# Patient Record
Sex: MALE | Race: WHITE | NOT HISPANIC OR LATINO | Employment: PART TIME | URBAN - METROPOLITAN AREA
[De-identification: names, ages, dates, MRNs, and addresses within clinical notes are randomized per-mention and may not be internally consistent; named-entity substitution may affect disease eponyms.]

---

## 2018-01-16 ENCOUNTER — OFFICE VISIT (OUTPATIENT)
Dept: RETAIL CLINIC | Facility: CLINIC | Age: 23
End: 2018-01-16

## 2018-01-16 VITALS
OXYGEN SATURATION: 98 % | TEMPERATURE: 97.6 F | HEART RATE: 92 BPM | SYSTOLIC BLOOD PRESSURE: 130 MMHG | DIASTOLIC BLOOD PRESSURE: 80 MMHG | RESPIRATION RATE: 18 BRPM

## 2018-01-16 DIAGNOSIS — H65.192 OTHER ACUTE NONSUPPURATIVE OTITIS MEDIA OF LEFT EAR, RECURRENCE NOT SPECIFIED: ICD-10-CM

## 2018-01-16 DIAGNOSIS — H65.93 BILATERAL SEROUS OTITIS MEDIA, UNSPECIFIED CHRONICITY: Primary | ICD-10-CM

## 2018-01-16 PROCEDURE — 99213 OFFICE O/P EST LOW 20 MIN: CPT | Performed by: NURSE PRACTITIONER

## 2018-01-16 RX ORDER — PREDNISONE 20 MG/1
20 TABLET ORAL 2 TIMES DAILY
Qty: 14 TABLET | Refills: 0 | Status: SHIPPED | OUTPATIENT
Start: 2018-01-16 | End: 2018-01-23

## 2018-01-16 RX ORDER — AMOXICILLIN 875 MG/1
875 TABLET, COATED ORAL EVERY 12 HOURS SCHEDULED
Qty: 20 TABLET | Refills: 0 | Status: SHIPPED | OUTPATIENT
Start: 2018-01-16 | End: 2018-01-26

## 2018-01-16 NOTE — PROGRESS NOTES
Subjective   Tobi Horne is a 22 y.o. male.     HPI Comments: Patient appears to be in great deal of pain during visit and rates left ear pain 9/10. Offered to call someone to transport him to higher level of care but he has declined.     Earache    There is pain in the left ear. This is a new problem. The current episode started today. The problem occurs constantly. The problem has been unchanged. There has been no fever. The pain is at a severity of 9/10. The pain is severe. Associated symptoms include hearing loss (left ear ) and neck pain (left side). Pertinent negatives include no abdominal pain, coughing, diarrhea, ear discharge, headaches, rhinorrhea, sore throat or vomiting. He has tried acetaminophen for the symptoms. The treatment provided no relief. His past medical history is significant for a tympanostomy tube (in childhood). There is no history of a chronic ear infection or hearing loss.       The following portions of the patient's history were reviewed and updated as appropriate: allergies, current medications, past family history, past medical history, past social history, past surgical history and problem list.    Review of Systems   Constitutional: Negative for appetite change, chills, diaphoresis, fatigue and fever.   HENT: Positive for ear pain (left only) and hearing loss (left ear ). Negative for congestion, ear discharge, facial swelling, mouth sores, nosebleeds, postnasal drip, rhinorrhea, sinus pain, sinus pressure, sneezing, sore throat, tinnitus, trouble swallowing and voice change.    Eyes: Negative for visual disturbance.   Respiratory: Negative for cough, chest tightness and wheezing.    Gastrointestinal: Negative for abdominal pain, constipation, diarrhea, nausea and vomiting.   Genitourinary: Negative for decreased urine volume.   Musculoskeletal: Positive for neck pain (left side). Negative for myalgias and neck stiffness.   Skin: Negative for color change and wound.    Allergic/Immunologic: Negative for immunocompromised state.   Neurological: Negative for dizziness and headaches.       Objective   Physical Exam   Constitutional: He is oriented to person, place, and time. He appears well-developed and well-nourished. He is cooperative.  Non-toxic appearance. He does not appear ill. He appears distressed (holding left ear ).   HENT:   Right Ear: Hearing, external ear and ear canal normal. No drainage, swelling or tenderness. No foreign bodies. No mastoid tenderness. Tympanic membrane is scarred. Tympanic membrane is not perforated, not erythematous, not retracted and not bulging. A middle ear effusion (small serous) is present. No decreased hearing is noted.   Left Ear: External ear and ear canal normal. There is tenderness. No drainage or swelling. No foreign bodies. No mastoid tenderness. Tympanic membrane is scarred, erythematous and bulging. Tympanic membrane is not perforated and not retracted. A middle ear effusion (large serous) is present. Decreased hearing is noted.   Nose: Nose normal. Right sinus exhibits no maxillary sinus tenderness and no frontal sinus tenderness. Left sinus exhibits no maxillary sinus tenderness and no frontal sinus tenderness.   Mouth/Throat: Uvula is midline, oropharynx is clear and moist and mucous membranes are normal. No oral lesions. No uvula swelling. No oropharyngeal exudate, posterior oropharyngeal edema or posterior oropharyngeal erythema. Tonsils are 2+ on the right. Tonsils are 2+ on the left. No tonsillar exudate.   Eyes: Conjunctivae and lids are normal.   Cardiovascular: Normal rate, regular rhythm, S1 normal and S2 normal.    Pulmonary/Chest: Effort normal and breath sounds normal.   Lymphadenopathy:     He has no cervical adenopathy.   Neurological: He is alert and oriented to person, place, and time.   Skin: Skin is warm and dry. He is not diaphoretic. No pallor.   Vitals reviewed.      Assessment/Plan   Tobi was seen today for  earache.    Diagnoses and all orders for this visit:    Acute serous otitis media of left ear, recurrence not specified  -     amoxicillin (AMOXIL) 875 MG tablet; Take 1 tablet by mouth Every 12 (Twelve) Hours for 10 days.  -     predniSONE (DELTASONE) 20 MG tablet; Take 1 tablet by mouth 2 (Two) Times a Day for 7 days.          -     May use warm compress to left ear 10 minute intervals x 3 times a day for pain relief        -     May alternate ibuprofen/tylenol OTC for pain relief        -     Follow up with PCP for persistent symptoms         -     Follow up with UC/ER for worsening symptoms

## 2018-01-16 NOTE — PATIENT INSTRUCTIONS
Serous Otitis Media  Serous otitis media is fluid in the middle ear space. This space contains the bones for hearing and air. Air in the middle ear space helps to transmit sound.   The air gets there through the eustachian tube. This tube goes from the back of the nose (nasopharynx) to the middle ear space. It keeps the pressure in the middle ear the same as the outside world. It also helps to drain fluid from the middle ear space.  CAUSES   Serous otitis media occurs when the eustachian tube gets blocked. Blockage can come from:  · Ear infections.  · Colds and other upper respiratory infections.  · Allergies.  · Irritants such as cigarette smoke.  · Sudden changes in air pressure (such as descending in an airplane).  · Enlarged adenoids.  · A mass in the nasopharynx.  During colds and upper respiratory infections, the middle ear space can become temporarily filled with fluid. This can happen after an ear infection also. Once the infection clears, the fluid will generally drain out of the ear through the eustachian tube. If it does not, then serous otitis media occurs.  SIGNS AND SYMPTOMS   · Hearing loss.  · A feeling of fullness in the ear, without pain.  · Young children may not show any symptoms but may show slight behavioral changes, such as agitation, ear pulling, or crying.  DIAGNOSIS   Serous otitis media is diagnosed by an ear exam. Tests may be done to check on the movement of the eardrum. Hearing exams may also be done.  TREATMENT   The fluid most often goes away without treatment. If allergy is the cause, allergy treatment may be helpful. Fluid that persists for several months may require minor surgery. A small tube is placed in the eardrum to:  · Drain the fluid.  · Restore the air in the middle ear space.  In certain situations, antibiotic medicines are used to avoid surgery. Surgery may be done to remove enlarged adenoids (if this is the cause).  HOME CARE INSTRUCTIONS   · Keep children away from  tobacco smoke.  · Keep all follow-up visits as directed by your health care provider.  SEEK MEDICAL CARE IF:   · Your hearing is not better in 3 months.  · Your hearing is worse.  · You have ear pain.  · You have drainage from the ear.  · You have dizziness.  · You have serous otitis media only in one ear or have any bleeding from your nose (epistaxis).  · You notice a lump on your neck.  MAKE SURE YOU:  · Understand these instructions.    · Will watch your condition.    · Will get help right away if you are not doing well or get worse.    This information is not intended to replace advice given to you by your health care provider. Make sure you discuss any questions you have with your health care provider.  Document Released: 03/09/2005 Document Revised: 01/08/2016 Document Reviewed: 07/15/2014  Elsevier Interactive Patient Education © 2017 [employer]

## 2021-01-08 ENCOUNTER — HOSPITAL ENCOUNTER (EMERGENCY)
Facility: HOSPITAL | Age: 26
Discharge: HOME | End: 2021-01-08
Admitting: EMERGENCY MEDICINE

## 2021-01-08 ENCOUNTER — APPOINTMENT (OUTPATIENT)
Dept: GENERAL RADIOLOGY | Facility: HOSPITAL | Age: 26
End: 2021-01-08

## 2021-01-08 VITALS
SYSTOLIC BLOOD PRESSURE: 104 MMHG | RESPIRATION RATE: 18 BRPM | WEIGHT: 141.31 LBS | BODY MASS INDEX: 19.14 KG/M2 | OXYGEN SATURATION: 99 % | HEART RATE: 64 BPM | DIASTOLIC BLOOD PRESSURE: 65 MMHG | TEMPERATURE: 97.6 F | HEIGHT: 72 IN

## 2021-01-08 DIAGNOSIS — R07.89 CHEST WALL PAIN: Primary | ICD-10-CM

## 2021-01-08 LAB
ALBUMIN SERPL-MCNC: 4.3 G/DL (ref 3.5–5.2)
ALBUMIN/GLOB SERPL: 2 G/DL
ALP SERPL-CCNC: 63 U/L (ref 39–117)
ALT SERPL W P-5'-P-CCNC: 7 U/L (ref 1–41)
ANION GAP SERPL CALCULATED.3IONS-SCNC: 8 MMOL/L (ref 5–15)
AST SERPL-CCNC: 10 U/L (ref 1–40)
BASOPHILS # BLD AUTO: 0 10*3/MM3 (ref 0–0.2)
BASOPHILS NFR BLD AUTO: 0.5 % (ref 0–1.5)
BILIRUB SERPL-MCNC: 0.3 MG/DL (ref 0–1.2)
BUN SERPL-MCNC: 11 MG/DL (ref 6–20)
BUN/CREAT SERPL: 15.1 (ref 7–25)
CALCIUM SPEC-SCNC: 9.4 MG/DL (ref 8.6–10.5)
CHLORIDE SERPL-SCNC: 105 MMOL/L (ref 98–107)
CO2 SERPL-SCNC: 27 MMOL/L (ref 22–29)
CREAT SERPL-MCNC: 0.73 MG/DL (ref 0.76–1.27)
D DIMER PPP FEU-MCNC: <0.19 MG/L (FEU) (ref 0–0.59)
DEPRECATED RDW RBC AUTO: 43.3 FL (ref 37–54)
EOSINOPHIL # BLD AUTO: 0.1 10*3/MM3 (ref 0–0.4)
EOSINOPHIL NFR BLD AUTO: 1.5 % (ref 0.3–6.2)
ERYTHROCYTE [DISTWIDTH] IN BLOOD BY AUTOMATED COUNT: 13.2 % (ref 12.3–15.4)
GFR SERPL CREATININE-BSD FRML MDRD: 131 ML/MIN/1.73
GLOBULIN UR ELPH-MCNC: 2.2 GM/DL
GLUCOSE SERPL-MCNC: 102 MG/DL (ref 65–99)
HCT VFR BLD AUTO: 45.2 % (ref 37.5–51)
HGB BLD-MCNC: 15.4 G/DL (ref 13–17.7)
HOLD SPECIMEN: NORMAL
HOLD SPECIMEN: NORMAL
INR PPP: 0.99 (ref 0.93–1.1)
LYMPHOCYTES # BLD AUTO: 2.8 10*3/MM3 (ref 0.7–3.1)
LYMPHOCYTES NFR BLD AUTO: 32.6 % (ref 19.6–45.3)
MCH RBC QN AUTO: 32 PG (ref 26.6–33)
MCHC RBC AUTO-ENTMCNC: 34.1 G/DL (ref 31.5–35.7)
MCV RBC AUTO: 93.9 FL (ref 79–97)
MONOCYTES # BLD AUTO: 0.8 10*3/MM3 (ref 0.1–0.9)
MONOCYTES NFR BLD AUTO: 9.6 % (ref 5–12)
NEUTROPHILS NFR BLD AUTO: 4.8 10*3/MM3 (ref 1.7–7)
NEUTROPHILS NFR BLD AUTO: 55.8 % (ref 42.7–76)
NRBC BLD AUTO-RTO: 0 /100 WBC (ref 0–0.2)
NT-PROBNP SERPL-MCNC: 30.5 PG/ML (ref 0–450)
PLATELET # BLD AUTO: 272 10*3/MM3 (ref 140–450)
PMV BLD AUTO: 9.1 FL (ref 6–12)
POTASSIUM SERPL-SCNC: 3.4 MMOL/L (ref 3.5–5.2)
PROT SERPL-MCNC: 6.5 G/DL (ref 6–8.5)
PROTHROMBIN TIME: 10.9 SECONDS (ref 9.6–11.7)
RBC # BLD AUTO: 4.81 10*6/MM3 (ref 4.14–5.8)
SODIUM SERPL-SCNC: 140 MMOL/L (ref 136–145)
TROPONIN T SERPL-MCNC: <0.01 NG/ML (ref 0–0.03)
WBC # BLD AUTO: 8.7 10*3/MM3 (ref 3.4–10.8)
WHOLE BLOOD HOLD SPECIMEN: NORMAL
WHOLE BLOOD HOLD SPECIMEN: NORMAL

## 2021-01-08 PROCEDURE — 85025 COMPLETE CBC W/AUTO DIFF WBC: CPT | Performed by: PHYSICIAN ASSISTANT

## 2021-01-08 PROCEDURE — 80053 COMPREHEN METABOLIC PANEL: CPT | Performed by: PHYSICIAN ASSISTANT

## 2021-01-08 PROCEDURE — 85379 FIBRIN DEGRADATION QUANT: CPT | Performed by: PHYSICIAN ASSISTANT

## 2021-01-08 PROCEDURE — 99284 EMERGENCY DEPT VISIT MOD MDM: CPT

## 2021-01-08 PROCEDURE — 84484 ASSAY OF TROPONIN QUANT: CPT | Performed by: PHYSICIAN ASSISTANT

## 2021-01-08 PROCEDURE — 83880 ASSAY OF NATRIURETIC PEPTIDE: CPT | Performed by: PHYSICIAN ASSISTANT

## 2021-01-08 PROCEDURE — 93005 ELECTROCARDIOGRAM TRACING: CPT

## 2021-01-08 PROCEDURE — 85610 PROTHROMBIN TIME: CPT | Performed by: PHYSICIAN ASSISTANT

## 2021-01-08 PROCEDURE — 71045 X-RAY EXAM CHEST 1 VIEW: CPT

## 2021-01-08 RX ORDER — SODIUM CHLORIDE 0.9 % (FLUSH) 0.9 %
10 SYRINGE (ML) INJECTION AS NEEDED
Status: DISCONTINUED | OUTPATIENT
Start: 2021-01-08 | End: 2021-01-09 | Stop reason: HOSPADM

## 2021-01-08 RX ORDER — ASPIRIN 81 MG/1
324 TABLET, CHEWABLE ORAL ONCE
Status: COMPLETED | OUTPATIENT
Start: 2021-01-08 | End: 2021-01-08

## 2021-01-08 RX ORDER — POTASSIUM CHLORIDE 20 MEQ/1
20 TABLET, EXTENDED RELEASE ORAL ONCE
Status: COMPLETED | OUTPATIENT
Start: 2021-01-08 | End: 2021-01-08

## 2021-01-08 RX ORDER — POTASSIUM CHLORIDE 20 MEQ/1
10 TABLET, EXTENDED RELEASE ORAL DAILY
Status: DISCONTINUED | OUTPATIENT
Start: 2021-01-09 | End: 2021-01-08

## 2021-01-08 RX ORDER — NAPROXEN 500 MG/1
500 TABLET ORAL 2 TIMES DAILY WITH MEALS
Qty: 20 TABLET | Refills: 0 | Status: SHIPPED | OUTPATIENT
Start: 2021-01-08 | End: 2021-01-20

## 2021-01-08 RX ADMIN — ASPIRIN 324 MG: 81 TABLET, CHEWABLE ORAL at 20:51

## 2021-01-08 RX ADMIN — POTASSIUM CHLORIDE 20 MEQ: 1500 TABLET, EXTENDED RELEASE ORAL at 22:39

## 2021-01-09 NOTE — DISCHARGE INSTRUCTIONS
You may take naproxen as needed for pain.  Do not mix with other NSAID such as ibuprofen diclofenac or Aleve.    Follow-up with your primary care provider in 3-5 days.  If you do not have a primary care provider call [phone] for help in finding one, or you may follow up with [facility] at [phone].    Follow-up with cardiology for further evaluation and management.    Return to ED for any new or worsening symptoms

## 2021-01-09 NOTE — ED PROVIDER NOTES
Subjective   Patient is a 25-year-old male who presents with complaints of squeezing type chest pain that is all the way across his chest that started the evening of 1/6/2021.  Patient states he was seen in the ED that day and was diagnosed with SVT he was given adenosine and sent home and was advised to follow-up with primary care provider, cardiology, and pulmonology.  Patient states when he was discharged he felt fine he took a nap and when he woke up that evening he was having aching type pain but states the pain has continued to progress over the past few days.  He currently rates it a 4/10 severity.  Patient states the pain does radiate into his left arm at times.  He states it is worse with exertion.  He reports associated shortness of breath.  Patient states the pain does feel different than when he went into the ED on 1/6/2021 he states then it felt more like heart palpitations today is more squeezing.  Pertinent negatives include bilateral lower extremity edema, fever, cough, nausea, vomiting, diarrhea, abdominal pain, recent travel, known sick contacts.  Patient states he was able to schedule follow-up appointment with his primary care provider for 20 January has not followed up with cardiology or pulmonology yet.  He was advised to follow-up with pulmonology as there were some pulmonary nodules noted on his chest x-ray.          Review of Systems   Constitutional: Negative.    HENT: Negative.    Eyes: Negative.    Respiratory: Positive for shortness of breath. Negative for apnea, cough, choking, chest tightness, wheezing and stridor.    Cardiovascular: Positive for chest pain. Negative for palpitations and leg swelling.   Gastrointestinal: Negative for abdominal distention, abdominal pain, constipation, diarrhea, nausea and vomiting.   Genitourinary: Negative.    Musculoskeletal: Negative for back pain, neck pain and neck stiffness.   Skin: Negative.    Neurological: Negative.    Hematological: Negative.         No past medical history on file.    No Known Allergies    Past Surgical History:   Procedure Laterality Date   • TYMPANOSTOMY         Family History   Problem Relation Age of Onset   • Nusrat Parkinson White syndrome Mother    • Alcohol abuse Father    • Glaucoma Maternal Grandfather    • Hypertension Paternal Grandmother    • Rheum arthritis Paternal Grandmother    • Diabetes Paternal Grandfather    • Alcohol abuse Paternal Grandfather        Social History     Socioeconomic History   • Marital status: Single     Spouse name: Not on file   • Number of children: 0   • Years of education: Not on file   • Highest education level: Not on file   Occupational History   • Occupation: warehouse    Tobacco Use   • Smoking status: Current Every Day Smoker     Types: Cigarettes   • Smokeless tobacco: Never Used   Substance and Sexual Activity   • Alcohol use: Yes     Comment: rare   • Drug use: No   • Sexual activity: Defer           Objective   Physical Exam  Vitals signs and nursing note reviewed.   Constitutional:       General: He is not in acute distress.     Appearance: He is well-developed. He is not ill-appearing, toxic-appearing or diaphoretic.   HENT:      Head: Normocephalic and atraumatic.      Mouth/Throat:      Mouth: Mucous membranes are moist.      Pharynx: Oropharynx is clear.   Eyes:      Extraocular Movements: Extraocular movements intact.      Pupils: Pupils are equal, round, and reactive to light.   Neck:      Musculoskeletal: Normal range of motion.   Cardiovascular:      Rate and Rhythm: Normal rate and regular rhythm.      Heart sounds: No murmur. No friction rub. No gallop.    Pulmonary:      Effort: Pulmonary effort is normal. No tachypnea, accessory muscle usage or respiratory distress.      Breath sounds: No decreased breath sounds, wheezing, rhonchi or rales.   Chest:      Chest wall: Tenderness present. No mass, deformity or crepitus.   Abdominal:      General: Bowel sounds are  "normal. There is no abdominal bruit.      Palpations: Abdomen is soft. There is no hepatomegaly, splenomegaly or mass.      Tenderness: There is no abdominal tenderness. There is no guarding or rebound.   Musculoskeletal:      Right lower leg: No edema.      Left lower leg: No edema.   Skin:     General: Skin is warm.      Capillary Refill: Capillary refill takes less than 2 seconds.      Findings: No rash.   Neurological:      General: No focal deficit present.      Mental Status: He is alert and oriented to person, place, and time.   Psychiatric:         Mood and Affect: Mood normal.         Behavior: Behavior normal.         Procedures           ED Course    /57   Pulse 70   Temp 97.6 °F (36.4 °C) (Oral)   Resp 18   Ht 182.9 cm (72\")   Wt 64.1 kg (141 lb 5 oz)   SpO2 99%   BMI 19.17 kg/m²   Medications   sodium chloride 0.9 % flush 10 mL (has no administration in time range)   sodium chloride 0.9 % flush 10 mL (has no administration in time range)   aspirin chewable tablet 324 mg (324 mg Oral Given 1/8/21 2051)   potassium chloride (K-DUR,KLOR-CON) CR tablet 20 mEq (20 mEq Oral Given 1/8/21 2239)     Labs Reviewed   COMPREHENSIVE METABOLIC PANEL - Abnormal; Notable for the following components:       Result Value    Glucose 102 (*)     Creatinine 0.73 (*)     Potassium 3.4 (*)     All other components within normal limits    Narrative:     GFR Normal >60  Chronic Kidney Disease <60  Kidney Failure <15     TROPONIN (IN-HOUSE) - Normal    Narrative:     Troponin T Reference Range:  <= 0.03 ng/mL-   Negative for AMI  >0.03 ng/mL-     Abnormal for myocardial necrosis.  Clinicians would have to utilize clinical acumen, EKG, Troponin and serial changes to determine if it is an Acute Myocardial Infarction or myocardial injury due to an underlying chronic condition.       Results may be falsely decreased if patient taking Biotin.     PROTIME-INR - Normal   BNP (IN-HOUSE) - Normal    Narrative:     Among " patients with dyspnea, NT-proBNP is highly sensitive for the detection of acute congestive heart failure. In addition NT-proBNP of <300 pg/ml effectively rules out acute congestive heart failure with 99% negative predictive value.    Results may be falsely decreased if patient taking Biotin.     D-DIMER, QUANTITATIVE - Normal    Narrative:     Reference Range  --------------------------------------------------------------------     < 0.50   Negative Predictive Value  0.50-0.59   Indeterminate    >= 0.60   Probable VTE             A very low percentage of patients with DVT may yield D-Dimer results   below the cut-off of 0.50 mg/L FEU.  This is known to be more   prevalent in patients with distal DVT.             Results of this test should always be interpreted in conjunction with   the patient's medical history, clinical presentation and other   findings.  Clinical diagnosis should not be based on the result of   [place] D-Dimer alone.   CBC WITH AUTO DIFFERENTIAL - Normal   RAINBOW DRAW    Narrative:     The following orders were created for panel order [place] Draw.  Procedure                               Abnormality         Status                     ---------                               -----------         ------                     Light Blue Top[[id number]]                                    Final result               Green Top (Gel)[[id number]]                                   Final result               Lavender Top[[id number]]                                      Final result               Gold Top - SST[[id number]]                                   Final result                 Please view results for these tests on the individual orders.   TROPONIN (IN-HOUSE)   LIGHT BLUE TOP   GREEN TOP   LAVENDER TOP   GOLD TOP - SST   CBC AND DIFFERENTIAL    Narrative:     The following orders were created for panel order CBC & Differential.  Procedure                               Abnormality         Status                      ---------                               -----------         ------                     CBC Auto Differential[[id number]]        Normal              Final result                 Please view results for these tests on the individual orders.     Xr Chest 1 View    Result Date: 1/8/2021  1.No acute pulmonary process.  Electronically Signed By-Mango Galan MD On:1/8/2021 8:52 PM This report was finalized on [id number] by  Mango Galan MD.                                           MDM  Number of Diagnoses or Management Options  Chest wall pain:   Diagnosis management comments: Chart Review:  Comorbidity: History of SVT  Differentials: ACS, electrolyte abnormality, PE, dissection, pleural effusion, pneumonia     ;this list is not all inclusive and does not constitute the entirety of considered causes  ECG: Interpreted by myself and Dr. Myers shows sinus rhythm rate 92 nonspecific T wave abnormalities in lateral leads no previous EKG to review.  Labs: Troponin pro time INR proBNP D-dimer all within normal limits as above.  CBC unremarkable CMP shows glucose 102 creatinine 0.73 sodium 3.4 is unremarkable as above. Old chart reviewed from patient's ED visit on 1/6/2021 which showed a potassium of 3.1  Imaging: Was interpreted by physician and reviewed by myself:  Xr Chest 1 View  Result Date: 1/8/2021  1.No acute pulmonary process.  Electronically Signed By-Mango Galan MD On:1/8/2021 8:52 PM This report was finalized on [id number] by  Mango Galan MD.    Disposition/Treatment:  Appropriate PPE was worn during exam and throughout all encounters with the patient.  When the ED IV was placed and labs are obtained patient was afebrile and appeared nontoxic his chest pain is reproducible.  Chart reviewed from patient's ED visit to [facility] looks like he presented to the ED in SVT he was given additional 6 mg of adenosine and then an additional 12 mg fairly unremarkable blood work and a negative urine drug screen.  He  was discharged home to follow-up with primary care provider cardiology and pulmonology.  Patient states his chest pain today is slightly different than it was at that time.  Lab results today were fairly unremarkable as well troponin D-dimer and proBNP within normal limits EKG showed sinus rhythm some nonspecific T wave abnormalities as above.  Chest x-ray showed no acute cardiopulmonary abnormalities.  Potassium was slightly low at 3.4 he was given p.o. potassium while in the ED was actually improved from 1/6/2021 at 3.1.  Lab results and findings were discussed with the patient. serial troponins, not needed at this time as his chest pain has been for over 24 hours.  I stressed the importance of following up with his primary care provider and cardiologist for further evaluation management.  He voiced understanding of discharge instructions along with signs and symptoms to return to the ED.  Patient was stable at time of discharge and in agreement with plan chest pain could be secondary to pleuritic type chest pain he will be given naproxen for home.        Amount and/or Complexity of Data Reviewed  Clinical lab tests: reviewed  Tests in the radiology section of CPT®: reviewed  Tests in the medicine section of CPT®: reviewed        Final diagnoses:   Chest wall pain            Sangita De La Torre PA  01/08/21 0351

## 2021-01-10 LAB — QT INTERVAL: 355 MS

## 2021-01-20 ENCOUNTER — OFFICE VISIT (OUTPATIENT)
Dept: FAMILY MEDICINE CLINIC | Facility: CLINIC | Age: 26
End: 2021-01-20

## 2021-01-20 VITALS
HEIGHT: 72 IN | OXYGEN SATURATION: 98 % | SYSTOLIC BLOOD PRESSURE: 107 MMHG | HEART RATE: 79 BPM | TEMPERATURE: 97.9 F | RESPIRATION RATE: 18 BRPM | BODY MASS INDEX: 19 KG/M2 | WEIGHT: 140.3 LBS | DIASTOLIC BLOOD PRESSURE: 69 MMHG

## 2021-01-20 DIAGNOSIS — Z13.220 SCREENING FOR HYPERLIPIDEMIA: ICD-10-CM

## 2021-01-20 DIAGNOSIS — R53.81 MALAISE AND FATIGUE: ICD-10-CM

## 2021-01-20 DIAGNOSIS — M25.50 ARTHRALGIA, UNSPECIFIED JOINT: ICD-10-CM

## 2021-01-20 DIAGNOSIS — I47.1 PAROXYSMAL SVT (SUPRAVENTRICULAR TACHYCARDIA) (HCC): ICD-10-CM

## 2021-01-20 DIAGNOSIS — Z00.00 ANNUAL PHYSICAL EXAM: Primary | ICD-10-CM

## 2021-01-20 DIAGNOSIS — R53.83 MALAISE AND FATIGUE: ICD-10-CM

## 2021-01-20 PROCEDURE — 99385 PREV VISIT NEW AGE 18-39: CPT | Performed by: FAMILY MEDICINE

## 2021-01-20 PROCEDURE — 99213 OFFICE O/P EST LOW 20 MIN: CPT | Performed by: FAMILY MEDICINE

## 2021-01-20 NOTE — PROGRESS NOTES
"Chief Complaint  Rapid Heart Rate (Er Followup from 1/6 & 1/8)    Subjective    History of Present Illness        Tobi Horne presents to [facility] for   History of Present Illness   ER Follow-up:   Patient reports that since he was around 21 he would randomly feel a heart palpitation which would cause him shortness of breath and he would cough and the feeling of palpitation would resolve.   On 1/6/21 he felt like he was having heart palpitations and shortness of breath. He tried to cough and bear down but the feel ing never resolved like it usually does. He went to [facility]. Upon arrival his HR was 210 on the monitor and stated he was in SVT. He was given 6mg of adenosine then another 12 mg adenosine which converted his heart back to sinus tachycardia. He was also given 1 mg of Ativan for anxiety. After monitoring, his heart rate decreased to 120. He was released from the ER with no new medications but advised to return if the symptoms occur again.   On 1/8/21 he went to [facility] with the same complaint and of increasing chest pain since leaving the ER on 1/6/21. His labs results during this ER visit were fairly unremarkable. EKG showed sinus rhythm with some nonspecific T wave abnormalities. Potassium was slightly low at 3.4 and he was given oral potassium in the ED. (On 1/6 his potassium was 3.1) He was advised to follow up with PCP and given naproxen for the chest pain which they suspected was pleuritic in nature.     He was also advised to follow up with pulmonology due to some \"tiny pulmonary nodules, unchanged from 2016 and considered benign.\" That was seen on his chest X ray performed on 1/6/21.     1/20/21:   Patient reports that he has been feeling well, chest has resolved. He has had no further episodes of SVT or palpitations. He has avoided caffeine for the  Most part. He is here today to be established with a PCP and be sent to cardiology and pulmonology as " "recommended by both ER physicians.     He reports the only time he is ever seen by a doctor if it is an urgent matter. He can't recall the last time he had a physical completed- which we will schedule him for one today.     Tobi Horne is a 25 y.o. male here for his annual physical with me. Tobi is here for coordination of medical care, to discuss health maintenance, disease prevention as well as to followup on medical problems. Patient has been followed by me since 01/20/21. Patient's last CPE was unknown. Activity level is minimal. Exercises 0 per week. Appetite is fair. Feels fairly well with few complaints. Energy level is poor. Sleeps poorly.   Patient reports he always has joint pain and muscle aches. He also reports he has severe fatigue and always feels worn out. He was previously tested for RA but when this came back negative they diagnosed him with Fibromyalgia- which he doesn't believe he has but no one would help him therefore he just gave up.   He works at [employer] in the [place] 30-40 hours per week. Even after a full nights sleep he still feels tired.         Objective   Vital Signs:   Visit Vitals  /69   Pulse 79   Temp 97.9 °F (36.6 °C)   Resp 18   Ht 182.9 cm (72\")   Wt 63.6 kg (140 lb 4.8 oz)   SpO2 98%   BMI 19.03 kg/m²       Physical Exam  Vitals signs reviewed.   Constitutional:       Appearance: He is well-developed.   HENT:      Head: Normocephalic and atraumatic.      Nose: Nose normal.   Eyes:      General: Lids are normal.      Conjunctiva/sclera: Conjunctivae normal.      Pupils: Pupils are equal, round, and reactive to light.   Neck:      Musculoskeletal: Normal range of motion and neck supple.   Cardiovascular:      Rate and Rhythm: Normal rate and regular rhythm.      Pulses: Normal pulses.      Heart sounds: Normal heart sounds.   Pulmonary:      Effort: Pulmonary effort is normal. No respiratory distress.      Breath sounds: Normal breath sounds.   Abdominal:      General: " Bowel sounds are normal.      Palpations: Abdomen is soft.   Musculoskeletal: Normal range of motion.   Skin:     General: Skin is warm and dry.      Capillary Refill: Capillary refill takes less than 2 seconds.   Neurological:      Mental Status: He is alert and oriented to person, place, and time.   Psychiatric:         Behavior: Behavior normal.         Thought Content: Thought content normal.         Judgment: Judgment normal.            Result Review :                    Assessment and Plan      Diagnoses and all orders for this visit:    1. Annual physical exam (Primary)  Assessment & Plan:  Discussed injury prevention, diet and exercise, safe sexual practices, and screening for common diseases. Encouraged use of sunscreen and seatbelts. Avoidance of tobacco encouraged. Limitation or avoidance of alcohol encouraged. Recommend yearly dental and eye exams. Also discussed monitoring of blood pressure, lipids.      2. Paroxysmal SVT (supraventricular tachycardia) (CMS/HCC)  Assessment & Plan:  Stable.  Patient will be referred to cardiology for further evaluation and treatment.      3. Arthralgia, unspecified joint  -     CBC & Differential  -     Comprehensive Metabolic Panel    4. Malaise and fatigue  -     CBC & Differential  -     Comprehensive Metabolic Panel  -     Vitamin D 25 Hydroxy  -     Vitamin B12  -     Folate  -     Testosterone, Free, Total  -     T4, Free  -     T4  -     TSH  -     Triiodothyronine (T3) Total & Free    5. Screening for hyperlipidemia  -     Lipid Panel With / Chol / HDL Ratio           Follow Up   No follow-ups on file.  Patient was given instructions and counseling regarding his condition or for health maintenance advice. Please see specific information pulled into the AVS if appropriate.

## 2021-01-21 PROBLEM — M25.50 ARTHRALGIA: Status: ACTIVE | Noted: 2021-01-21

## 2021-01-21 PROBLEM — I47.1 PAROXYSMAL SVT (SUPRAVENTRICULAR TACHYCARDIA) (HCC): Status: ACTIVE | Noted: 2021-01-21

## 2021-01-21 PROBLEM — I47.10 PAROXYSMAL SVT (SUPRAVENTRICULAR TACHYCARDIA): Status: ACTIVE | Noted: 2021-01-21

## 2021-01-22 LAB
25(OH)D3+25(OH)D2 SERPL-MCNC: 16.3 NG/ML (ref 30–100)
ALBUMIN SERPL-MCNC: 5.1 G/DL (ref 4.1–5.2)
ALBUMIN/GLOB SERPL: 2.1 {RATIO} (ref 1.2–2.2)
ALP SERPL-CCNC: 64 IU/L (ref 39–117)
ALT SERPL-CCNC: 9 IU/L (ref 0–44)
AST SERPL-CCNC: 14 IU/L (ref 0–40)
BASOPHILS # BLD AUTO: 0 X10E3/UL (ref 0–0.2)
BASOPHILS NFR BLD AUTO: 1 %
BILIRUB SERPL-MCNC: 0.5 MG/DL (ref 0–1.2)
BUN SERPL-MCNC: 9 MG/DL (ref 6–20)
BUN/CREAT SERPL: 11 (ref 9–20)
CALCIUM SERPL-MCNC: 10.3 MG/DL (ref 8.7–10.2)
CHLORIDE SERPL-SCNC: 102 MMOL/L (ref 96–106)
CHOLEST SERPL-MCNC: 172 MG/DL (ref 100–199)
CHOLEST/HDLC SERPL: 2.8 RATIO (ref 0–5)
CO2 SERPL-SCNC: 22 MMOL/L (ref 20–29)
CREAT SERPL-MCNC: 0.81 MG/DL (ref 0.76–1.27)
EOSINOPHIL # BLD AUTO: 0.1 X10E3/UL (ref 0–0.4)
EOSINOPHIL NFR BLD AUTO: 1 %
ERYTHROCYTE [DISTWIDTH] IN BLOOD BY AUTOMATED COUNT: 12.1 % (ref 11.6–15.4)
FOLATE SERPL-MCNC: 17.7 NG/ML
GLOBULIN SER CALC-MCNC: 2.4 G/DL (ref 1.5–4.5)
GLUCOSE SERPL-MCNC: 81 MG/DL (ref 65–99)
HCT VFR BLD AUTO: 43.4 % (ref 37.5–51)
HDLC SERPL-MCNC: 62 MG/DL
HGB BLD-MCNC: 15.1 G/DL (ref 13–17.7)
IMM GRANULOCYTES # BLD AUTO: 0 X10E3/UL (ref 0–0.1)
IMM GRANULOCYTES NFR BLD AUTO: 0 %
LDLC SERPL CALC-MCNC: 101 MG/DL (ref 0–99)
LYMPHOCYTES # BLD AUTO: 1.9 X10E3/UL (ref 0.7–3.1)
LYMPHOCYTES NFR BLD AUTO: 31 %
MCH RBC QN AUTO: 32.2 PG (ref 26.6–33)
MCHC RBC AUTO-ENTMCNC: 34.8 G/DL (ref 31.5–35.7)
MCV RBC AUTO: 93 FL (ref 79–97)
MONOCYTES # BLD AUTO: 0.6 X10E3/UL (ref 0.1–0.9)
MONOCYTES NFR BLD AUTO: 10 %
NEUTROPHILS # BLD AUTO: 3.5 X10E3/UL (ref 1.4–7)
NEUTROPHILS NFR BLD AUTO: 57 %
PLATELET # BLD AUTO: 303 X10E3/UL (ref 150–450)
POTASSIUM SERPL-SCNC: 4.2 MMOL/L (ref 3.5–5.2)
PROT SERPL-MCNC: 7.5 G/DL (ref 6–8.5)
RBC # BLD AUTO: 4.69 X10E6/UL (ref 4.14–5.8)
SODIUM SERPL-SCNC: 141 MMOL/L (ref 134–144)
T3 SERPL-MCNC: 127 NG/DL (ref 71–180)
T3FREE SERPL-MCNC: 3.8 PG/ML (ref 2–4.4)
T4 FREE SERPL-MCNC: 1.33 NG/DL (ref 0.82–1.77)
T4 SERPL-MCNC: 7.6 UG/DL (ref 4.5–12)
TESTOST FREE SERPL-MCNC: 12 PG/ML (ref 9.3–26.5)
TESTOST SERPL-MCNC: 399 NG/DL (ref 264–916)
TRIGL SERPL-MCNC: 43 MG/DL (ref 0–149)
TSH SERPL DL<=0.005 MIU/L-ACNC: 7.48 UIU/ML (ref 0.45–4.5)
VIT B12 SERPL-MCNC: 1469 PG/ML (ref 232–1245)
VLDLC SERPL CALC-MCNC: 9 MG/DL (ref 5–40)
WBC # BLD AUTO: 6 X10E3/UL (ref 3.4–10.8)

## 2021-02-03 ENCOUNTER — OFFICE VISIT (OUTPATIENT)
Dept: FAMILY MEDICINE CLINIC | Facility: CLINIC | Age: 26
End: 2021-02-03

## 2021-02-03 VITALS
DIASTOLIC BLOOD PRESSURE: 60 MMHG | BODY MASS INDEX: 18.56 KG/M2 | SYSTOLIC BLOOD PRESSURE: 107 MMHG | TEMPERATURE: 98.1 F | HEART RATE: 75 BPM | OXYGEN SATURATION: 99 % | HEIGHT: 72 IN | WEIGHT: 137 LBS | RESPIRATION RATE: 18 BRPM

## 2021-02-03 DIAGNOSIS — I47.1 PAROXYSMAL SVT (SUPRAVENTRICULAR TACHYCARDIA) (HCC): ICD-10-CM

## 2021-02-03 DIAGNOSIS — E03.9 ACQUIRED HYPOTHYROIDISM: Primary | ICD-10-CM

## 2021-02-03 PROCEDURE — 99213 OFFICE O/P EST LOW 20 MIN: CPT | Performed by: FAMILY MEDICINE

## 2021-02-03 NOTE — PROGRESS NOTES
"Chief Complaint  Labs Only and Hypothyroidism    Subjective    History of Present Illness        Tobi Horne presents to [facility] for   2/3/21- The patient is here today and following up on his labs that was drawn 1/20/21. His cholesterol panel looked good minus his . His vitamin D level was low at 16.3 and his Vitamin B12 was elevated at 1,469. His TSH was relevated at 7.480. He states that he is a big energy drink intake and he states that he drinks the No calorie NO carb and No sugar but he states that he thinks it has vitamins in it.     Hypothyroidism  This is a new problem. The current episode started today. The problem occurs daily. The problem has been unchanged. Associated symptoms include fatigue. Pertinent negatives include no abdominal pain, anorexia, arthralgias, change in bowel habit, chest pain, chills, congestion, coughing, diaphoresis, fever, headaches, joint swelling, myalgias, nausea, neck pain, numbness, rash, sore throat, swollen glands, urinary symptoms, vertigo, visual change, vomiting or weakness. Associated symptoms comments: Patient is here today and following up on the labs that he had done and his TSH was elevated and he states that he has to his knowledge never had an issues with his thyroid. He states that he is always fatigue. . Nothing aggravates the symptoms. He has tried nothing for the symptoms. The treatment provided no relief.        Objective   Vital Signs:   Visit Vitals  /60   Pulse 75   Temp 98.1 °F (36.7 °C)   Resp 18   Ht 182.9 cm (72\")   Wt 62.1 kg (137 lb)   SpO2 99%   BMI 18.58 kg/m²       Physical Exam  Vitals signs reviewed.   Constitutional:       Appearance: He is well-developed.   HENT:      Head: Normocephalic.      Right Ear: External ear normal.      Left Ear: External ear normal.      Nose: Nose normal.   Eyes:      Conjunctiva/sclera: Conjunctivae normal.   Neck:      Musculoskeletal: Normal range of motion " and neck supple.   Cardiovascular:      Rate and Rhythm: Normal rate and regular rhythm.   Pulmonary:      Effort: Pulmonary effort is normal.      Breath sounds: Normal breath sounds.   Musculoskeletal: Normal range of motion.   Skin:     General: Skin is warm and dry.      Capillary Refill: Capillary refill takes less than 2 seconds.   Neurological:      Mental Status: He is alert and oriented to person, place, and time.            Result Review :      CBC    CBC 1/6/21 1/8/21 1/20/21   WBC 7.57 8.70 6.0   RBC 4.81 4.81 4.69   Hemoglobin 15.3 15.4 15.1   Hematocrit 44.9 45.2 43.4   MCV 93.3 93.9 93   MCH 31.8 32.0 32.2   MCHC 34.1 34.1 34.8   RDW 12.3 13.2 12.1   Platelets 281 272 303           CBC w/diff    CBC w/Diff 1/6/21 1/8/21 1/20/21   WBC 7.57 8.70 6.0   RBC 4.81 4.81 4.69   Hemoglobin 15.3 15.4 15.1   Hematocrit 44.9 45.2 43.4   MCV 93.3 93.9 93   MCH 31.8 32.0 32.2   MCHC 34.1 34.1 34.8   RDW 12.3 13.2 12.1   Platelets 281 272 303   Neutrophil Rel % 43.4 (A) 55.8 57   Immature Granulocyte Rel % 0.3     Lymphocyte Rel % 43.5 32.6 31   Monocyte Rel % 10.6 9.6 10   Eosinophil Rel % 1.5 1.5 1   Basophil Rel % 0.7 0.5 1   (A) Abnormal value            Lipid Panel    Lipid Panel 1/20/21   Total Cholesterol 172   Triglycerides 43   HDL Cholesterol 62   VLDL Cholesterol 9   LDL Cholesterol  101 (A)   (A) Abnormal value            TSH    TSH 1/20/21   TSH 7.480 (A)   (A) Abnormal value                        Assessment and Plan      Diagnoses and all orders for this visit:    1. Acquired hypothyroidism (Primary)  Assessment & Plan:  Recheck thyroid labs in 3 to 4 months for follow-up.  We will check antibodies due to elevated TSH.      2. Paroxysmal SVT (supraventricular tachycardia) (CMS/HCC)  Assessment & Plan:  Patient reports having any symptoms since his last visit.  No treatment needed at this time.  Per patient request cardiology consult will be held until further notice.             Follow Up   No  follow-ups on file.  Patient was given instructions and counseling regarding his condition or for health maintenance advice. Please see specific information pulled into the AVS if appropriate.

## 2021-02-03 NOTE — ASSESSMENT & PLAN NOTE
Patient reports having any symptoms since his last visit.  No treatment needed at this time.  Per patient request cardiology consult will be held until further notice.

## 2021-02-03 NOTE — ASSESSMENT & PLAN NOTE
Recheck thyroid labs in 3 to 4 months for follow-up.  We will check antibodies due to elevated TSH.

## 2022-04-27 ENCOUNTER — OFFICE VISIT (OUTPATIENT)
Dept: FAMILY MEDICINE CLINIC | Facility: CLINIC | Age: 27
End: 2022-04-27

## 2022-04-27 VITALS
SYSTOLIC BLOOD PRESSURE: 102 MMHG | OXYGEN SATURATION: 96 % | WEIGHT: 159 LBS | TEMPERATURE: 98.9 F | HEIGHT: 72 IN | DIASTOLIC BLOOD PRESSURE: 65 MMHG | BODY MASS INDEX: 21.54 KG/M2 | HEART RATE: 84 BPM

## 2022-04-27 DIAGNOSIS — F17.210 CIGARETTE NICOTINE DEPENDENCE WITHOUT COMPLICATION: ICD-10-CM

## 2022-04-27 DIAGNOSIS — M54.42 ACUTE MIDLINE LOW BACK PAIN WITH LEFT-SIDED SCIATICA: Primary | ICD-10-CM

## 2022-04-27 PROCEDURE — 99204 OFFICE O/P NEW MOD 45 MIN: CPT | Performed by: NURSE PRACTITIONER

## 2022-04-27 RX ORDER — METHOCARBAMOL 750 MG/1
750 TABLET, FILM COATED ORAL 3 TIMES DAILY
Qty: 30 TABLET | Refills: 0 | Status: SHIPPED | OUTPATIENT
Start: 2022-04-27 | End: 2022-05-19

## 2022-04-27 RX ORDER — IBUPROFEN 600 MG/1
600 TABLET ORAL EVERY 6 HOURS PRN
Qty: 60 TABLET | Refills: 1 | Status: SHIPPED | OUTPATIENT
Start: 2022-04-27 | End: 2022-05-03

## 2022-04-27 NOTE — PROGRESS NOTES
"Harman Horne is a 27 y.o. male presents for   Chief Complaint   Patient presents with   • Establish Care   • work comp   • Back Pain     Lower happened Saturday at work.         Health Maintenance Due   Topic Date Due   • COVID-19 Vaccine (1) Never done   • Pneumococcal Vaccine 0-64 (1 - PCV) Never done   • HEPATITIS C SCREENING  Never done   • TDAP/TD VACCINES (2 - Td or Tdap) 09/01/2020   • ANNUAL PHYSICAL  01/21/2022       History of Present Illness   Pt present for a new patient exam.  He states he had a back injury at work last week.  He works at [email or web address] and states he bent over to pick something up and states pain was initially minimal, but when he started walking down the stairs and stepped down on his left foot, pain radiated down his leg and up his back.  He states now he cannot stand up straight or bend over and walking is painful.  He states he has worked the past 4 days and has taken it easy.  He tried taking tylenol and states it did not help back pain and has not tried ibuprofen.  He has applied heat and states it helps some but pain always returns.  He has tried some stretching and states it is painful. He states left leg has been painful on the outer aspect and back on his thigh.      Vitals:    04/27/22 0938   BP: 102/65   BP Location: Right arm   Patient Position: Sitting   Cuff Size: Adult   Pulse: 84   Temp: 98.9 °F (37.2 °C)   TempSrc: Temporal   SpO2: 96%   Weight: 72.1 kg (159 lb)   Height: 182.9 cm (72\")     Body mass index is 21.56 kg/m².    No current outpatient medications on file prior to visit.     No current facility-administered medications on file prior to visit.       The following portions of the patient's history were reviewed and updated as appropriate: allergies, current medications, past family history, past medical history, past social history, past surgical history, and problem list.    Review of Systems   Constitutional: Negative for chills and fever.   HENT: " Negative for sinus pressure and sore throat.    Eyes: Negative for blurred vision.   Respiratory: Negative for cough and shortness of breath.    Cardiovascular: Negative for chest pain.   Gastrointestinal: Negative for abdominal pain.   Endocrine: Negative.    Genitourinary: Negative.    Musculoskeletal: Positive for back pain. Negative for arthralgias and joint swelling.   Skin: Negative for color change.   Allergic/Immunologic: Negative.    Neurological: Negative for dizziness.   Hematological: Negative.    Psychiatric/Behavioral: Negative for behavioral problems.       Objective   Physical Exam  Vitals and nursing note reviewed.   Constitutional:       Appearance: Normal appearance. He is well-developed.   HENT:      Head: Normocephalic and atraumatic.      Right Ear: External ear normal.      Left Ear: External ear normal.      Nose: Nose normal.   Eyes:      Extraocular Movements: Extraocular movements intact.      Pupils: Pupils are equal, round, and reactive to light.   Cardiovascular:      Rate and Rhythm: Normal rate and regular rhythm.      Heart sounds: Normal heart sounds.   Pulmonary:      Effort: Pulmonary effort is normal.      Breath sounds: Normal breath sounds.   Abdominal:      General: Bowel sounds are normal.      Palpations: Abdomen is soft.   Musculoskeletal:         General: Normal range of motion.      Cervical back: Normal range of motion and neck supple.      Comments: Lumbar spine-ROM limited in all directions, positive left SLR, BLE strength wnl, right patellar reflex diminished, left patellar reflex wnl   Skin:     General: Skin is warm and dry.   Neurological:      General: No focal deficit present.      Mental Status: He is alert and oriented to person, place, and time.      Cranial Nerves: Cranial nerves are intact.      Sensory: Sensation is intact.      Coordination: Coordination abnormal. Heel to Shin Test abnormal (unable to stand on 1 foot to complete).      Gait: Gait abnormal  and tandem walk abnormal.   Psychiatric:         Mood and Affect: Mood normal.         Behavior: Behavior normal.       PHQ-9 Total Score:      Assessment/Plan   Diagnoses and all orders for this visit:    1. Acute midline low back pain with left-sided sciatica (Primary)  Comments:  work restrictions given  Orders:  -     ibuprofen (ADVIL,MOTRIN) 600 MG tablet; Take 1 tablet by mouth Every 6 (Six) Hours As Needed for Mild Pain .  Dispense: 60 tablet; Refill: 1  -     methocarbamol (ROBAXIN) 750 MG tablet; Take 1 tablet by mouth 3 (Three) Times a Day.  Dispense: 30 tablet; Refill: 0  -     MRI Lumbar Spine Without Contrast; Future    2. Cigarette nicotine dependence without complication  Comments:  smoking cessation encouraged and  pt counseled on effects of healing        There are no Patient Instructions on file for this visit.

## 2022-05-02 ENCOUNTER — TELEPHONE (OUTPATIENT)
Dept: FAMILY MEDICINE CLINIC | Facility: CLINIC | Age: 27
End: 2022-05-02

## 2022-05-02 NOTE — TELEPHONE ENCOUNTER
PATIENT'S WIFE CALLED REGARDING HIS UPCOMING MRI     YOU ARE TO BE RECEIVING A FAX OVER FROM [employer] REQUESTING INFORMATION     HAVE YOU RECEIVED THIS YET / WORKERS [facility]  [phone]- EDUIN- ON  [place]

## 2022-05-03 ENCOUNTER — OFFICE VISIT (OUTPATIENT)
Dept: FAMILY MEDICINE CLINIC | Facility: CLINIC | Age: 27
End: 2022-05-03

## 2022-05-03 VITALS
WEIGHT: 159 LBS | DIASTOLIC BLOOD PRESSURE: 66 MMHG | SYSTOLIC BLOOD PRESSURE: 106 MMHG | HEIGHT: 72 IN | RESPIRATION RATE: 16 BRPM | HEART RATE: 67 BPM | BODY MASS INDEX: 21.54 KG/M2 | TEMPERATURE: 98.1 F

## 2022-05-03 DIAGNOSIS — M54.42 ACUTE MIDLINE LOW BACK PAIN WITH LEFT-SIDED SCIATICA: Primary | ICD-10-CM

## 2022-05-03 PROCEDURE — 99213 OFFICE O/P EST LOW 20 MIN: CPT | Performed by: NURSE PRACTITIONER

## 2022-05-03 RX ORDER — METHYLPREDNISOLONE 4 MG/1
TABLET ORAL
Qty: 1 EACH | Refills: 0 | Status: SHIPPED | OUTPATIENT
Start: 2022-05-03 | End: 2022-05-19

## 2022-05-03 NOTE — PROGRESS NOTES
"Subjective   Tobi Horne is a 27 y.o. male presents for   Chief Complaint   Patient presents with   • Back Pain     Update restrictions at work - back pain is much worse per pt       Health Maintenance Due   Topic Date Due   • COVID-19 Vaccine (1) Never done   • Pneumococcal Vaccine 0-64 (1 - PCV) Never done   • HEPATITIS C SCREENING  Never done   • TDAP/TD VACCINES (2 - Td or Tdap) 09/01/2020   • ANNUAL PHYSICAL  01/21/2022       History of Present Illness   Pt present to follow up back pain.  He states pain has been getting worse.  He has not had a day off since last appt and states entire back and legs are now painful.  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   He reports he feels better after having time off and taking muscle relaxer and resting.  He states he does not notice a significant improvement with advil.      Vitals:    05/03/22 1100   BP: 106/66   BP Location: Left arm   Patient Position: Sitting   Cuff Size: Adult   Pulse: 67   Resp: 16   Temp: 98.1 °F (36.7 °C)   TempSrc: Temporal   Weight: 72.1 kg (159 lb)   Height: 182.9 cm (72\")     Body mass index is 21.56 kg/m².    Current Outpatient Medications on File Prior to Visit   Medication Sig Dispense Refill   • methocarbamol (ROBAXIN) 750 MG tablet Take 1 tablet by mouth 3 (Three) Times a Day. 30 tablet 0   • [DISCONTINUED] ibuprofen (ADVIL,MOTRIN) 600 MG tablet Take 1 tablet by mouth Every 6 (Six) Hours As Needed for Mild Pain . 60 tablet 1     No current facility-administered medications on file prior to visit.       The following portions of the patient's history were reviewed and updated as appropriate: allergies, current medications, past family history, past medical history, past social history, past surgical history, and problem list.    Review of Systems   Constitutional: Negative for chills and fever.   HENT: Negative for sinus pressure and sore throat.    Eyes: Negative for blurred vision. "   Respiratory: Negative for cough and shortness of breath.    Cardiovascular: Negative for chest pain.   Gastrointestinal: Negative for abdominal pain.   Endocrine: Negative.    Genitourinary: Negative.    Musculoskeletal: Positive for back pain. Negative for arthralgias and joint swelling.   Skin: Negative for color change.   Allergic/Immunologic: Negative.    Neurological: Negative for dizziness.   Hematological: Negative.    Psychiatric/Behavioral: Negative for behavioral problems.       Objective   Physical Exam  Vitals and nursing note reviewed.   Constitutional:       General: He is in acute distress.      Appearance: Normal appearance. He is well-developed.   HENT:      Head: Normocephalic and atraumatic.      Right Ear: External ear normal.      Left Ear: External ear normal.      Nose: Nose normal.   Eyes:      Extraocular Movements: Extraocular movements intact.      Pupils: Pupils are equal, round, and reactive to light.   Cardiovascular:      Rate and Rhythm: Normal rate and regular rhythm.      Heart sounds: Normal heart sounds.   Pulmonary:      Effort: Pulmonary effort is normal.      Breath sounds: Normal breath sounds.   Abdominal:      General: Bowel sounds are normal.      Palpations: Abdomen is soft.   Musculoskeletal:         General: Tenderness (lumbar spine) present. Normal range of motion.      Cervical back: Normal range of motion and neck supple.      Comments: Lumbar spine rom limited- flexion 45 degrees, extension 10 degrees, right lateral flexion 10 degrees, left lateral flexion 20 degrees, right rotation 10 degrees, left rotation 10 degrees  BLE reflexes equal, diminished  Positive SLR bilaterally   Lumbar spine tender to palpation   Skin:     General: Skin is warm and dry.   Neurological:      General: No focal deficit present.      Mental Status: He is alert and oriented to person, place, and time.   Psychiatric:         Mood and Affect: Mood normal.         Behavior: Behavior normal.        PHQ-9 Total Score:      Assessment/Plan   Diagnoses and all orders for this visit:    1. Acute midline low back pain with left-sided sciatica (Primary)  Comments:  stop ibuprofen, medrol as directed on pack  Orders:  -     XR Spine Lumbar Complete 4+VW; Future  -     methylPREDNISolone (MEDROL) 4 MG dose pack; Take as directed on package instructions.  Dispense: 1 each; Refill: 0  -     Ambulatory Referral to Physical Therapy Evaluate and treat; Heat, Electrotherapy; Moist heat, Ultrasound, Phonophoresis; Tens (Home), Iontophoresis, E-stim; Cross Fiber, Soft Tissue Mobilizaton; Stretching, ROM, Strengthening        There are no Patient Instructions on file for this visit.

## 2022-05-19 ENCOUNTER — OFFICE VISIT (OUTPATIENT)
Dept: FAMILY MEDICINE CLINIC | Facility: CLINIC | Age: 27
End: 2022-05-19

## 2022-05-19 VITALS
BODY MASS INDEX: 20.99 KG/M2 | HEIGHT: 72 IN | TEMPERATURE: 98.6 F | SYSTOLIC BLOOD PRESSURE: 104 MMHG | WEIGHT: 155 LBS | DIASTOLIC BLOOD PRESSURE: 72 MMHG | OXYGEN SATURATION: 95 % | HEART RATE: 103 BPM

## 2022-05-19 DIAGNOSIS — M54.42 ACUTE MIDLINE LOW BACK PAIN WITH LEFT-SIDED SCIATICA: Primary | ICD-10-CM

## 2022-05-19 PROCEDURE — 99213 OFFICE O/P EST LOW 20 MIN: CPT | Performed by: NURSE PRACTITIONER

## 2022-05-19 NOTE — PROGRESS NOTES
"Subjective   Tobi Horne is a 27 y.o. male presents for   Chief Complaint   Patient presents with   • Back Pain     Pressure if not doing anything. After standing for a while it hurts pretty bad.        Health Maintenance Due   Topic Date Due   • COVID-19 Vaccine (1) Never done   • Pneumococcal Vaccine 0-64 (1 - PCV) Never done   • HEPATITIS C SCREENING  Never done   • TDAP/TD VACCINES (2 - Td or Tdap) 09/01/2020   • ANNUAL PHYSICAL  01/21/2022       History of Present Illness   Pt present to follow up back pain.  He states pain alternates but is more painful after standing for a while.  He has also started physical therapy at [facility] and states they recommended sit down duty, but does not currently have any written recommendations from them.  He is scheduled again for PT tomorrow at 10 am.  He is taking ibuprofen prn, and has completed the steroid pack.  He states pain didn't improve with steroids but did seem to improve range of motion. He is no longer taking robaxin.     Vitals:    05/19/22 1421   BP: 104/72   BP Location: Left arm   Patient Position: Sitting   Cuff Size: Adult   Pulse: 103   Temp: 98.6 °F (37 °C)   TempSrc: Temporal   SpO2: 95%   Weight: 70.3 kg (155 lb)   Height: 182.9 cm (72\")     Body mass index is 21.02 kg/m².    Current Outpatient Medications on File Prior to Visit   Medication Sig Dispense Refill   • [DISCONTINUED] methocarbamol (ROBAXIN) 750 MG tablet Take 1 tablet by mouth 3 (Three) Times a Day. 30 tablet 0   • [DISCONTINUED] methylPREDNISolone (MEDROL) 4 MG dose pack Take as directed on package instructions. 1 each 0     No current facility-administered medications on file prior to visit.       The following portions of the patient's history were reviewed and updated as appropriate: allergies, current medications, past family history, past medical history, past social history, past surgical history, and problem list.    Review of Systems   Constitutional: Negative for chills and " fever.   HENT: Negative for sinus pressure and sore throat.    Eyes: Negative for blurred vision.   Respiratory: Negative for cough and shortness of breath.    Cardiovascular: Negative for chest pain.   Gastrointestinal: Negative for abdominal pain.   Endocrine: Negative.    Genitourinary: Negative.    Musculoskeletal: Positive for back pain. Negative for arthralgias and joint swelling.   Skin: Negative for color change.   Allergic/Immunologic: Negative.    Neurological: Negative for dizziness.   Hematological: Negative.    Psychiatric/Behavioral: Negative for behavioral problems.       Objective   Physical Exam  Vitals and nursing note reviewed.   Constitutional:       Appearance: Normal appearance. He is well-developed.   HENT:      Head: Normocephalic and atraumatic.      Right Ear: External ear normal.      Left Ear: External ear normal.      Nose: Nose normal.   Eyes:      Extraocular Movements: Extraocular movements intact.      Pupils: Pupils are equal, round, and reactive to light.   Cardiovascular:      Rate and Rhythm: Normal rate and regular rhythm.      Heart sounds: Normal heart sounds.   Pulmonary:      Effort: Pulmonary effort is normal.      Breath sounds: Normal breath sounds.   Abdominal:      General: Bowel sounds are normal.      Palpations: Abdomen is soft.   Musculoskeletal:         General: Normal range of motion.      Cervical back: Normal range of motion and neck supple.      Comments: Limited ROM lumbar spine in all fields, but improved in bilateral rotation and bending, extension 10%, flexion 20%   Skin:     General: Skin is warm and dry.   Neurological:      General: No focal deficit present.      Mental Status: He is alert and oriented to person, place, and time.      Cranial Nerves: Cranial nerves are intact.      Sensory: Sensation is intact.      Motor: Motor function is intact.      Coordination: Coordination abnormal.      Gait: Gait abnormal.   Psychiatric:         Mood and Affect:  Mood normal.         Behavior: Behavior normal.       PHQ-9 Total Score:      Assessment & Plan   Diagnoses and all orders for this visit:    1. Acute midline low back pain with left-sided sciatica (Primary)  Comments:  PT records reviewed and restrictions completed for 2 additional weeks as he progresses through PT . pt encouraged to continue nsaids.        There are no Patient Instructions on file for this visit.

## 2022-06-13 ENCOUNTER — TELEPHONE (OUTPATIENT)
Dept: FAMILY MEDICINE CLINIC | Facility: CLINIC | Age: 27
End: 2022-06-13

## 2022-06-13 ENCOUNTER — OFFICE VISIT (OUTPATIENT)
Dept: FAMILY MEDICINE CLINIC | Facility: CLINIC | Age: 27
End: 2022-06-13

## 2022-06-13 VITALS
DIASTOLIC BLOOD PRESSURE: 66 MMHG | SYSTOLIC BLOOD PRESSURE: 98 MMHG | OXYGEN SATURATION: 96 % | HEART RATE: 85 BPM | BODY MASS INDEX: 20.94 KG/M2 | HEIGHT: 72 IN | WEIGHT: 154.6 LBS | TEMPERATURE: 99.3 F

## 2022-06-13 DIAGNOSIS — M54.42 ACUTE MIDLINE LOW BACK PAIN WITH LEFT-SIDED SCIATICA: Primary | ICD-10-CM

## 2022-06-13 PROCEDURE — 99213 OFFICE O/P EST LOW 20 MIN: CPT | Performed by: NURSE PRACTITIONER

## 2022-06-13 NOTE — PROGRESS NOTES
"Harman Horne is a 27 y.o. male presents for   Chief Complaint   Patient presents with   • work comp       Health Maintenance Due   Topic Date Due   • COVID-19 Vaccine (1) Never done   • Pneumococcal Vaccine 0-64 (1 - PCV) Never done   • HEPATITIS C SCREENING  Never done   • TDAP/TD VACCINES (2 - Td or Tdap) 09/01/2020   • ANNUAL PHYSICAL  01/21/2022       History of Present Illness   Pt present for follow up back pain.  He states he has been going to PT and following their recommendations.  He states pain is currently 2/10 and states it worsens when on his feet for an extended period of time.  He states he feels like he is improving well and has 2 more weeks with physical therapy scheduled. He states he had an MRI done.  Results have not come to this office but will request them.      Vitals:    06/13/22 1521   BP: 98/66   BP Location: Right arm   Patient Position: Sitting   Cuff Size: Adult   Pulse: 85   Temp: 99.3 °F (37.4 °C)   TempSrc: Temporal   SpO2: 96%   Weight: 70.1 kg (154 lb 9.6 oz)   Height: 182.9 cm (72\")     Body mass index is 20.97 kg/m².    No current outpatient medications on file prior to visit.     No current facility-administered medications on file prior to visit.       The following portions of the patient's history were reviewed and updated as appropriate: allergies, current medications, past family history, past medical history, past social history, past surgical history, and problem list.    Review of Systems   Constitutional: Negative for chills and fever.   HENT: Negative for sinus pressure and sore throat.    Eyes: Negative for blurred vision.   Respiratory: Negative for cough and shortness of breath.    Cardiovascular: Negative for chest pain.   Gastrointestinal: Negative for abdominal pain.   Endocrine: Negative.    Genitourinary: Negative.    Musculoskeletal: Positive for back pain. Negative for arthralgias and joint swelling.   Skin: Negative for color change. "   Allergic/Immunologic: Negative.    Neurological: Negative for dizziness.   Hematological: Negative.    Psychiatric/Behavioral: Negative for behavioral problems.       Objective   Physical Exam  Vitals and nursing note reviewed.   Constitutional:       Appearance: Normal appearance. He is well-developed.   HENT:      Head: Normocephalic and atraumatic.      Right Ear: External ear normal.      Left Ear: External ear normal.      Nose: Nose normal.   Eyes:      Extraocular Movements: Extraocular movements intact.      Pupils: Pupils are equal, round, and reactive to light.   Cardiovascular:      Rate and Rhythm: Normal rate and regular rhythm.      Heart sounds: Normal heart sounds.   Pulmonary:      Effort: Pulmonary effort is normal.      Breath sounds: Normal breath sounds.   Abdominal:      General: Bowel sounds are normal.      Palpations: Abdomen is soft.   Musculoskeletal:         General: No tenderness. Normal range of motion.      Cervical back: Normal range of motion and neck supple.      Comments: Lumbar ROM slightly limited in flexion and extension.  BLE reflexes diminished but equal.    Mobility is stiff when moving from sitting to standing, and when sitting back down.    Skin:     General: Skin is warm and dry.   Neurological:      General: No focal deficit present.      Mental Status: He is alert and oriented to person, place, and time.   Psychiatric:         Mood and Affect: Mood normal.         Behavior: Behavior normal.       PHQ-9 Total Score:      Assessment & Plan   Diagnoses and all orders for this visit:    1. Acute midline low back pain with left-sided sciatica (Primary)  Comments:  improving, continue PT.  will see back after PT completed for release to work.  will continue 10 lb restrictions with goal to RTW without restrictions.         There are no Patient Instructions on file for this visit.

## 2022-06-14 ENCOUNTER — TELEPHONE (OUTPATIENT)
Dept: FAMILY MEDICINE CLINIC | Facility: CLINIC | Age: 27
End: 2022-06-14

## 2022-06-14 NOTE — TELEPHONE ENCOUNTER
Caller: LIZZIE    Relationship: [facility]    Best call back number:     What is the best time to reach you:     Who are you requesting to speak with (clinical staff, provider,  specific staff member):     Do you know the name of the person who called:     What was the call regarding: LIZZIE IS CALLING IN TO REQUEST A CALL BACK TO DISCUSS THE DATE OF THE PATIENTS LAST OFFICE VISIT.  HE IS ALSO REQUESTING ANY NOTES FROM THAT VISIT.  THE PATIENT IS A WORKMANS COMP PATIENT.     Do you require a callback: YES

## 2022-06-16 NOTE — TELEPHONE ENCOUNTER
Hub to read    Left detailed message from Armando to leave Fax number for med records or to send us a fax request. Last OV 6/13/22

## 2022-06-16 NOTE — TELEPHONE ENCOUNTER
Called Armando myself for fax number - no answer- faxed to the same place I did last time. The last OV and the work status report form.

## 2022-06-23 ENCOUNTER — OFFICE VISIT (OUTPATIENT)
Dept: FAMILY MEDICINE CLINIC | Facility: CLINIC | Age: 27
End: 2022-06-23

## 2022-06-23 VITALS
SYSTOLIC BLOOD PRESSURE: 109 MMHG | BODY MASS INDEX: 21.54 KG/M2 | TEMPERATURE: 98.6 F | DIASTOLIC BLOOD PRESSURE: 66 MMHG | OXYGEN SATURATION: 97 % | HEIGHT: 72 IN | WEIGHT: 159 LBS | HEART RATE: 73 BPM

## 2022-06-23 DIAGNOSIS — M54.42 ACUTE MIDLINE LOW BACK PAIN WITH LEFT-SIDED SCIATICA: Primary | ICD-10-CM

## 2022-06-23 PROCEDURE — 99213 OFFICE O/P EST LOW 20 MIN: CPT | Performed by: NURSE PRACTITIONER

## 2022-06-23 NOTE — PROGRESS NOTES
"Harman Horne is a 27 y.o. male presents for   Chief Complaint   Patient presents with   • Back Pain       Health Maintenance Due   Topic Date Due   • COVID-19 Vaccine (1) Never done   • Pneumococcal Vaccine 0-64 (1 - PCV) Never done   • HEPATITIS C SCREENING  Never done   • TDAP/TD VACCINES (2 - Td or Tdap) 09/01/2020   • ANNUAL PHYSICAL  01/21/2022       History of Present Illness   Pt present to follow up back pain.  He states he about 85-90% back to normal.  He states he experiences bouts of stiffness and intermittent nerve pain.  He states he is working to keep moving and is still current with physical therapy.  He would like to return to work.    Vitals:    06/23/22 0831   BP: 109/66   BP Location: Left arm   Patient Position: Sitting   Cuff Size: Adult   Pulse: 73   Temp: 98.6 °F (37 °C)   TempSrc: Temporal   SpO2: 97%   Weight: 72.1 kg (159 lb)   Height: 182.9 cm (72\")     Body mass index is 21.56 kg/m².    No current outpatient medications on file prior to visit.     No current facility-administered medications on file prior to visit.       The following portions of the patient's history were reviewed and updated as appropriate: allergies, current medications, past family history, past medical history, past social history, past surgical history, and problem list.    Review of Systems   Constitutional: Negative for chills and fever.   HENT: Negative for sinus pressure and sore throat.    Eyes: Negative for blurred vision.   Respiratory: Negative for cough and shortness of breath.    Cardiovascular: Negative for chest pain.   Gastrointestinal: Negative for abdominal pain.   Endocrine: Negative.    Genitourinary: Negative.    Musculoskeletal: Positive for back pain. Negative for arthralgias and joint swelling.   Skin: Negative for color change.   Neurological: Negative for dizziness.   Psychiatric/Behavioral: Negative for behavioral problems.       Objective   Physical Exam  Vitals and nursing " note reviewed.   Constitutional:       Appearance: Normal appearance. He is well-developed.   HENT:      Head: Normocephalic and atraumatic.      Right Ear: External ear normal.      Left Ear: External ear normal.      Nose: Nose normal.   Eyes:      Extraocular Movements: Extraocular movements intact.      Pupils: Pupils are equal, round, and reactive to light.   Cardiovascular:      Rate and Rhythm: Normal rate and regular rhythm.      Heart sounds: Normal heart sounds.   Pulmonary:      Effort: Pulmonary effort is normal.      Breath sounds: Normal breath sounds.   Abdominal:      General: Bowel sounds are normal.      Palpations: Abdomen is soft.   Musculoskeletal:         General: Normal range of motion.      Cervical back: Normal range of motion and neck supple.      Comments: ROM limited in flexion, wnl in all other fields.   BLE strength 5/5, RLE DTR diminished, LLE DTR wnl   Skin:     General: Skin is warm and dry.   Neurological:      General: No focal deficit present.      Mental Status: He is alert and oriented to person, place, and time.   Psychiatric:         Mood and Affect: Mood normal.         Behavior: Behavior normal.       PHQ-9 Total Score:      Assessment & Plan   Diagnoses and all orders for this visit:    1. Acute midline low back pain with left-sided sciatica (Primary)  Comments:  improving, ready to return to work, note given.  pt instructed to call for worsening         There are no Patient Instructions on file for this visit.

## 2022-10-14 ENCOUNTER — CLINICAL SUPPORT (OUTPATIENT)
Dept: FAMILY MEDICINE CLINIC | Facility: CLINIC | Age: 27
End: 2022-10-14

## 2022-10-14 DIAGNOSIS — R05.9 COUGH, UNSPECIFIED TYPE: Primary | ICD-10-CM

## 2022-10-14 PROCEDURE — U0004 COV-19 TEST NON-CDC HGH THRU: HCPCS | Performed by: NURSE PRACTITIONER

## 2022-10-14 PROCEDURE — 99211 OFF/OP EST MAY X REQ PHY/QHP: CPT | Performed by: PREVENTIVE MEDICINE

## 2022-10-15 LAB — SARS-COV-2 ORF1AB RESP QL NAA+PROBE: DETECTED

## 2023-08-07 NOTE — PATIENT INSTRUCTIONS
Tenzin Zurita on YouTube      Health Maintenance Due   Topic Date Due    HEPATITIS C SCREENING  Never done    TDAP/TD VACCINES (2 - Td or Tdap) 09/01/2020    ANNUAL PHYSICAL  01/20/2022      You are due for adacel Tdap vaccination. (provides protection against tetanus, diptheria and whooping cough) Please  get the immunization at your local pharmacy at your earliest convenience. This immunization will next be due in 10 years. Please click on the link for more information about this vaccine.    CDC Tdap Vaccine Information

## 2023-08-14 ENCOUNTER — OFFICE VISIT (OUTPATIENT)
Dept: FAMILY MEDICINE CLINIC | Facility: CLINIC | Age: 28
End: 2023-08-14
Payer: COMMERCIAL

## 2023-08-14 VITALS
TEMPERATURE: 99.6 F | SYSTOLIC BLOOD PRESSURE: 117 MMHG | HEIGHT: 72 IN | HEART RATE: 84 BPM | WEIGHT: 137 LBS | OXYGEN SATURATION: 98 % | DIASTOLIC BLOOD PRESSURE: 75 MMHG | BODY MASS INDEX: 18.56 KG/M2

## 2023-08-14 DIAGNOSIS — F41.9 ANXIETY AND DEPRESSION: ICD-10-CM

## 2023-08-14 DIAGNOSIS — M54.42 CHRONIC MIDLINE LOW BACK PAIN WITH LEFT-SIDED SCIATICA: ICD-10-CM

## 2023-08-14 DIAGNOSIS — Z23 NEED FOR TDAP VACCINATION: ICD-10-CM

## 2023-08-14 DIAGNOSIS — G89.29 CHRONIC MIDLINE LOW BACK PAIN WITH LEFT-SIDED SCIATICA: ICD-10-CM

## 2023-08-14 DIAGNOSIS — R41.840 DIFFICULTY CONCENTRATING: ICD-10-CM

## 2023-08-14 DIAGNOSIS — B36.0 TINEA VERSICOLOR: ICD-10-CM

## 2023-08-14 DIAGNOSIS — Z00.01 ENCOUNTER FOR GENERAL ADULT MEDICAL EXAMINATION WITH ABNORMAL FINDINGS: Primary | ICD-10-CM

## 2023-08-14 DIAGNOSIS — F32.A ANXIETY AND DEPRESSION: ICD-10-CM

## 2023-08-14 PROCEDURE — 90715 TDAP VACCINE 7 YRS/> IM: CPT | Performed by: NURSE PRACTITIONER

## 2023-08-14 PROCEDURE — 99395 PREV VISIT EST AGE 18-39: CPT | Performed by: NURSE PRACTITIONER

## 2023-08-14 PROCEDURE — 90471 IMMUNIZATION ADMIN: CPT | Performed by: NURSE PRACTITIONER

## 2023-08-14 PROCEDURE — 99214 OFFICE O/P EST MOD 30 MIN: CPT | Performed by: NURSE PRACTITIONER

## 2023-08-14 RX ORDER — KETOCONAZOLE 20 MG/G
1 CREAM TOPICAL DAILY
Qty: 60 G | Refills: 2 | Status: SHIPPED | OUTPATIENT
Start: 2023-08-14

## 2023-08-14 RX ORDER — BUPROPION HYDROCHLORIDE 300 MG/1
300 TABLET ORAL DAILY
Qty: 90 TABLET | Refills: 1 | Status: SHIPPED | OUTPATIENT
Start: 2023-08-14

## 2023-08-14 NOTE — PROGRESS NOTES
"Harman Horne is a 28 y.o. male presents for annual exam.  Chief Complaint   Patient presents with    Annual Exam       Health Maintenance Due   Topic Date Due    HEPATITIS C SCREENING  Never done    ANNUAL PHYSICAL  01/20/2022     The patient presents for annual exam and to follow-up depression and anxiety. He discontinued the use of buspirone due to making him feel physically \"weird\". He does not notice any positive improvement with Wellbutrin but also denies side effects. He states his wife has told him that he is doing slightly better. He admits that he is making a conscious effort to do better. He has been taking 10,000 IU of vitamin D daily.  Discussed with patient increasing Wellbutrin due to the fact that he is not experiencing side effects but that others are noticing improvement in him.  He is agreeable to plan.    The patient does not feel like he has lost weight recently. He denies any appetite loss. He has tried to add protein powder to his diet but has failed to remember to add it to his routine.  Discussed utilizing meal replacement drinks as well since they are already prepared.    The patient locates red lesions on his chest. He admits to sweating constantly. The rash has worsened over the last 8 months.  It does not itch and is not painful.    He also reports intermittent low back pain since an injury at work last year.  He performs intermittent stretches and states that helps at times.      Vitals:    08/14/23 1304   BP: 117/75   BP Location: Right arm   Patient Position: Sitting   Cuff Size: Adult   Pulse: 84   Temp: 99.6 øF (37.6 øC)   TempSrc: Tympanic   SpO2: 98%   Weight: 62.1 kg (137 lb)   Height: 182.9 cm (72.01\")     Body mass index is 18.58 kg/mý.    No current outpatient medications on file prior to visit.     No current facility-administered medications on file prior to visit.       The following portions of the patient's history were reviewed and updated as appropriate: " allergies, current medications, past family history, past medical history, past social history, past surgical history, and problem list.    Review of Systems   Skin:  Positive for rash.   Psychiatric/Behavioral:  Positive for decreased concentration and depressed mood.      Objective   Physical Exam  Vitals and nursing note reviewed.   Constitutional:       Appearance: Normal appearance. He is well-developed.   HENT:      Head: Normocephalic and atraumatic.      Right Ear: Tympanic membrane, ear canal and external ear normal.      Left Ear: Tympanic membrane, ear canal and external ear normal.      Nose: Nose normal.   Eyes:      Extraocular Movements: Extraocular movements intact.      Conjunctiva/sclera: Conjunctivae normal.      Pupils: Pupils are equal, round, and reactive to light.   Cardiovascular:      Rate and Rhythm: Normal rate and regular rhythm.      Pulses: Normal pulses.      Heart sounds: Normal heart sounds.   Pulmonary:      Effort: Pulmonary effort is normal.      Breath sounds: Normal breath sounds.   Abdominal:      General: Bowel sounds are normal.      Palpations: Abdomen is soft.   Musculoskeletal:         General: Normal range of motion.      Cervical back: Normal range of motion and neck supple.   Skin:     General: Skin is warm and dry.      Comments: Multiple well demarcated, ovoid, finely scaly patches that are hyperpigmented on his chest.   Neurological:      General: No focal deficit present.      Mental Status: He is alert and oriented to person, place, and time.   Psychiatric:         Mood and Affect: Mood normal.         Behavior: Behavior normal.     PHQ-9 Total Score:      Assessment & Plan   Diagnoses and all orders for this visit:    1. Encounter for general adult medical examination with abnormal findings (Primary)  Comments:  Counseled on importance of balanced diet and routine exercise as well as risk and benefits of vaccines.  Encouragement replacement drinks when appetite is  poor    2. Anxiety and depression  -     buPROPion XL (Wellbutrin XL) 300 MG 24 hr tablet; Take 1 tablet by mouth Daily.  Dispense: 90 tablet; Refill: 1  -     Ambulatory Referral to Psychiatry    3. Difficulty concentrating  Comments:  Referral to [facility] for neuropsych eval  Orders:  -     Ambulatory Referral to Psychiatry    4. Need for Tdap vaccination  -     Tdap Vaccine Greater Than or Equal To 6yo IM    5. Tinea versicolor  -     ketoconazole (NIZORAL) 2 % cream; Apply 1 application  topically to the appropriate area as directed Daily.  Dispense: 60 g; Refill: 2    6. Chronic midline low back pain with left-sided sciatica      1. Anxiety and depression-   The patient will increase Wellbutrin 300 mg daily.     2. Difficulty concentrating-  The patient will be referred to psychiatry for further evaluation.     3. Recurrent back pain-  I will give the patient some stretching exercises to perform as well as a  on YouTube to help relieve back pain.     4. Rash-  The patient will apply ketoconazole 2 percent cream on area.     Patient Instructions   Yoga with Yudith on YouTube      Health Maintenance Due   Topic Date Due    HEPATITIS C SCREENING  Never done    TDAP/TD VACCINES (2 - Td or Tdap) 09/01/2020    ANNUAL PHYSICAL  01/20/2022      You are due for adacel Tdap vaccination. (provides protection against tetanus, diptheria and whooping cough) Please  get the immunization at your local pharmacy at your earliest convenience. This immunization will next be due in 10 years. Please click on the link for more information about this vaccine.    [facility] Tdap Vaccine Information          Transcribed from ambient dictation for CRISTOBAL Meier by Marla Watts.  08/14/23   15:35 EDT    Patient or patient representative verbalized consent to the visit recording.  I have personally performed the services described in this document as transcribed by the above individual, and it is both accurate  and complete.

## 2024-03-12 ENCOUNTER — OFFICE VISIT (OUTPATIENT)
Dept: FAMILY MEDICINE CLINIC | Facility: CLINIC | Age: 29
End: 2024-03-12
Payer: COMMERCIAL

## 2024-03-12 VITALS
BODY MASS INDEX: 20.23 KG/M2 | OXYGEN SATURATION: 98 % | TEMPERATURE: 98.9 F | WEIGHT: 149.2 LBS | DIASTOLIC BLOOD PRESSURE: 61 MMHG | HEART RATE: 70 BPM | SYSTOLIC BLOOD PRESSURE: 113 MMHG

## 2024-03-12 DIAGNOSIS — M54.2 NECK PAIN: Primary | ICD-10-CM

## 2024-03-12 DIAGNOSIS — F32.A ANXIETY AND DEPRESSION: ICD-10-CM

## 2024-03-12 DIAGNOSIS — M54.9 MID BACK PAIN: ICD-10-CM

## 2024-03-12 DIAGNOSIS — F41.9 ANXIETY AND DEPRESSION: ICD-10-CM

## 2024-03-12 DIAGNOSIS — E03.9 ACQUIRED HYPOTHYROIDISM: ICD-10-CM

## 2024-03-12 DIAGNOSIS — M54.42 ACUTE MIDLINE LOW BACK PAIN WITH LEFT-SIDED SCIATICA: ICD-10-CM

## 2024-03-12 DIAGNOSIS — E55.9 VITAMIN D DEFICIENCY: ICD-10-CM

## 2024-03-12 LAB
25(OH)D3 SERPL-MCNC: 29.6 NG/ML (ref 30–100)
ALBUMIN SERPL-MCNC: 5.1 G/DL (ref 3.5–5.2)
ALBUMIN/GLOB SERPL: 2 G/DL
ALP SERPL-CCNC: 75 U/L (ref 39–117)
ALT SERPL W P-5'-P-CCNC: 22 U/L (ref 1–41)
ANION GAP SERPL CALCULATED.3IONS-SCNC: 10 MMOL/L (ref 5–15)
AST SERPL-CCNC: 18 U/L (ref 1–40)
BILIRUB SERPL-MCNC: 0.5 MG/DL (ref 0–1.2)
BUN SERPL-MCNC: 8 MG/DL (ref 6–20)
BUN/CREAT SERPL: 9 (ref 7–25)
CALCIUM SPEC-SCNC: 10.3 MG/DL (ref 8.6–10.5)
CHLORIDE SERPL-SCNC: 101 MMOL/L (ref 98–107)
CHOLEST SERPL-MCNC: 173 MG/DL (ref 0–200)
CO2 SERPL-SCNC: 29 MMOL/L (ref 22–29)
CREAT SERPL-MCNC: 0.89 MG/DL (ref 0.76–1.27)
EGFRCR SERPLBLD CKD-EPI 2021: 119 ML/MIN/1.73
GLOBULIN UR ELPH-MCNC: 2.6 GM/DL
GLUCOSE SERPL-MCNC: 61 MG/DL (ref 65–99)
HDLC SERPL-MCNC: 75 MG/DL (ref 40–60)
LDLC SERPL CALC-MCNC: 88 MG/DL (ref 0–100)
LDLC/HDLC SERPL: 1.18 {RATIO}
POTASSIUM SERPL-SCNC: 3.8 MMOL/L (ref 3.5–5.2)
PROT SERPL-MCNC: 7.7 G/DL (ref 6–8.5)
SODIUM SERPL-SCNC: 140 MMOL/L (ref 136–145)
TRIGL SERPL-MCNC: 47 MG/DL (ref 0–150)
TSH SERPL DL<=0.05 MIU/L-ACNC: 6.11 UIU/ML (ref 0.27–4.2)
VLDLC SERPL-MCNC: 10 MG/DL (ref 5–40)

## 2024-03-12 PROCEDURE — 85025 COMPLETE CBC W/AUTO DIFF WBC: CPT | Performed by: NURSE PRACTITIONER

## 2024-03-12 PROCEDURE — 80053 COMPREHEN METABOLIC PANEL: CPT | Performed by: NURSE PRACTITIONER

## 2024-03-12 PROCEDURE — 82306 VITAMIN D 25 HYDROXY: CPT | Performed by: NURSE PRACTITIONER

## 2024-03-12 PROCEDURE — 80061 LIPID PANEL: CPT | Performed by: NURSE PRACTITIONER

## 2024-03-12 PROCEDURE — 84443 ASSAY THYROID STIM HORMONE: CPT | Performed by: NURSE PRACTITIONER

## 2024-03-12 PROCEDURE — 99214 OFFICE O/P EST MOD 30 MIN: CPT | Performed by: NURSE PRACTITIONER

## 2024-03-12 RX ORDER — VENLAFAXINE HYDROCHLORIDE 37.5 MG/1
CAPSULE, EXTENDED RELEASE ORAL
COMMUNITY
Start: 2024-03-06

## 2024-03-12 RX ORDER — METHOCARBAMOL 750 MG/1
750 TABLET, FILM COATED ORAL 3 TIMES DAILY
Qty: 15 TABLET | Refills: 0 | Status: SHIPPED | OUTPATIENT
Start: 2024-03-12

## 2024-03-12 NOTE — PROGRESS NOTES
Subjective   Tobi Horne is a 29 y.o. male presents for   Chief Complaint   Patient presents with    Follow-up    Back Pain       Health Maintenance Due   Topic Date Due    HEPATITIS C SCREENING  Never done    INFLUENZA VACCINE  08/01/2023    COVID-19 Vaccine (1 - 2023-24 season) Never done       Back Pain       Pt present for 6 month follow up back pain.  He is currently in therapy and feels like it is helpful.   He is also seeing a psychiatrist and has been taking effexor for a week with no problems or side effects.    He states back pain is ongoing.  He reports a persisitent spot on the left side of his neck and another spot in between his shoulder blades that are contstant and very painful.  He states standing up straight hurts.  He reports it is difficult to stand up straight.  He reports pain radiates down both arms.  He also reports low back pain is present with radiation down both legs.  He has gone through physical therapy and states he has still been doing stretches at home.    He also reports dry skin, constipation and hair loss for the last few months.  Tsh has been increased for the past several years consistently around 7, however pt remained asymptomatic and has not taken medication in the past.       Vitals:    03/12/24 1312   BP: 113/61   BP Location: Right arm   Patient Position: Sitting   Cuff Size: Adult   Pulse: 70   Temp: 98.9 °F (37.2 °C)   SpO2: 98%   Weight: 67.7 kg (149 lb 3.2 oz)     Body mass index is 20.23 kg/m².    Current Outpatient Medications on File Prior to Visit   Medication Sig Dispense Refill    venlafaxine XR (EFFEXOR-XR) 37.5 MG 24 hr capsule  (Patient not taking: Reported on 3/12/2024)      [DISCONTINUED] buPROPion XL (Wellbutrin XL) 300 MG 24 hr tablet Take 1 tablet by mouth Daily. (Patient not taking: Reported on 3/12/2024) 90 tablet 1    [DISCONTINUED] ketoconazole (NIZORAL) 2 % cream Apply 1 application  topically to the appropriate area as directed Daily. (Patient  not taking: Reported on 3/12/2024) 60 g 2     No current facility-administered medications on file prior to visit.       The following portions of the patient's history were reviewed and updated as appropriate: allergies, current medications, past family history, past medical history, past social history, past surgical history, and problem list.    Review of Systems   Musculoskeletal:  Positive for back pain.       Objective   Physical Exam  Vitals and nursing note reviewed.   Constitutional:       Appearance: Normal appearance. He is well-developed.   HENT:      Head: Normocephalic and atraumatic.      Right Ear: External ear normal.      Left Ear: External ear normal.      Nose: Nose normal.   Eyes:      Extraocular Movements: Extraocular movements intact.      Pupils: Pupils are equal, round, and reactive to light.   Cardiovascular:      Rate and Rhythm: Normal rate and regular rhythm.      Heart sounds: Normal heart sounds.   Pulmonary:      Effort: Pulmonary effort is normal.      Breath sounds: Normal breath sounds.   Abdominal:      General: Bowel sounds are normal.      Palpations: Abdomen is soft.   Musculoskeletal:         General: Normal range of motion.      Cervical back: Normal range of motion and neck supple. Bony tenderness present. No crepitus. Pain with movement (extension, left rotation, left lateral bend) present.      Thoracic back: Bony tenderness present.        Back:       Comments: Tenderness of trapezius muscles  BUE strength 5/5  Negative spurling maneuver bilaterally   Skin:     General: Skin is warm and dry.   Neurological:      General: No focal deficit present.      Mental Status: He is alert and oriented to person, place, and time.   Psychiatric:         Mood and Affect: Mood normal.         Behavior: Behavior normal.       PHQ-9 Total Score:      Assessment & Plan   Diagnoses and all orders for this visit:    1. Neck pain (Primary)  -     XR Spine Cervical Complete 4 or 5 View;  Future  -     methocarbamol (ROBAXIN) 750 MG tablet; Take 1 tablet by mouth 3 (Three) Times a Day.  Dispense: 15 tablet; Refill: 0    2. Mid back pain  -     XR Spine Thoracic 3 View (In Office); Future  -     methocarbamol (ROBAXIN) 750 MG tablet; Take 1 tablet by mouth 3 (Three) Times a Day.  Dispense: 15 tablet; Refill: 0    3. Acute midline low back pain with left-sided sciatica  Comments:  work restrictions given    4. Acquired hypothyroidism  -     TSH    5. Vitamin D deficiency  -     Vitamin D 25 hydroxy    6. Anxiety and depression  -     Cancel: CBC Auto Differential  -     Comprehensive Metabolic Panel  -     Lipid Panel        Patient Instructions   Yoga with Rhonda on You Tube

## 2024-04-01 ENCOUNTER — OFFICE VISIT (OUTPATIENT)
Dept: FAMILY MEDICINE CLINIC | Facility: CLINIC | Age: 29
End: 2024-04-01
Payer: COMMERCIAL

## 2024-04-01 VITALS
BODY MASS INDEX: 20.4 KG/M2 | HEART RATE: 92 BPM | HEIGHT: 72 IN | DIASTOLIC BLOOD PRESSURE: 64 MMHG | SYSTOLIC BLOOD PRESSURE: 100 MMHG | OXYGEN SATURATION: 98 % | TEMPERATURE: 98.6 F | WEIGHT: 150.6 LBS

## 2024-04-01 DIAGNOSIS — J02.9 SORE THROAT: Primary | ICD-10-CM

## 2024-04-01 DIAGNOSIS — G89.29 CHRONIC BILATERAL LOW BACK PAIN WITHOUT SCIATICA: ICD-10-CM

## 2024-04-01 DIAGNOSIS — H66.002 NON-RECURRENT ACUTE SUPPURATIVE OTITIS MEDIA OF LEFT EAR WITHOUT SPONTANEOUS RUPTURE OF TYMPANIC MEMBRANE: ICD-10-CM

## 2024-04-01 DIAGNOSIS — M54.50 CHRONIC BILATERAL LOW BACK PAIN WITHOUT SCIATICA: ICD-10-CM

## 2024-04-01 LAB
EXPIRATION DATE: NORMAL
EXPIRATION DATE: NORMAL
FLUAV AG UPPER RESP QL IA.RAPID: NOT DETECTED
FLUBV AG UPPER RESP QL IA.RAPID: NOT DETECTED
INTERNAL CONTROL: NORMAL
INTERNAL CONTROL: NORMAL
Lab: NORMAL
Lab: NORMAL
S PYO AG THROAT QL: NEGATIVE
SARS-COV-2 AG UPPER RESP QL IA.RAPID: NOT DETECTED

## 2024-04-01 PROCEDURE — 87428 SARSCOV & INF VIR A&B AG IA: CPT | Performed by: NURSE PRACTITIONER

## 2024-04-01 PROCEDURE — 99214 OFFICE O/P EST MOD 30 MIN: CPT | Performed by: NURSE PRACTITIONER

## 2024-04-01 RX ORDER — LORATADINE 10 MG/1
10 TABLET ORAL DAILY
Qty: 90 TABLET | Refills: 1 | Status: SHIPPED | OUTPATIENT
Start: 2024-04-01

## 2024-04-01 RX ORDER — AMOXICILLIN 875 MG/1
875 TABLET, COATED ORAL 2 TIMES DAILY
Qty: 20 TABLET | Refills: 0 | Status: SHIPPED | OUTPATIENT
Start: 2024-04-01

## 2024-04-01 NOTE — PROGRESS NOTES
"Subjective   Tobi Horne is a 29 y.o. male presents for   Chief Complaint   Patient presents with    Sore Throat     Been ill for a week.  Wife is sick as well    Cough    URI       Health Maintenance Due   Topic Date Due    HEPATITIS C SCREENING  Never done    COVID-19 Vaccine (1 - 2023-24 season) Never done       Sore Throat   Associated symptoms include coughing and ear pain.   Cough  Associated symptoms include ear pain and a sore throat.   URI   Associated symptoms include coughing, ear pain and a sore throat.      Pt present for complaints of sore throat, cough and URI symptoms for the past week.  He states left ear is painful and worsens when driving down a hill.  He has been taking tylenol for symptoms and some relief of symptoms.   He also reports low back pain ongoing, and is noticed a knot on the right side of his lower back.  He requests an x-ray to be ordered that he can complete when he completes his other x-rays that were ordered at previous appointment.  Patient counseled that the x-ray will likely not show the soft tissue mass that he is describing.  He verbalized understanding.  Vitals:    04/01/24 1457   BP: 100/64   BP Location: Right arm   Patient Position: Sitting   Cuff Size: Adult   Pulse: 92   Temp: 98.6 °F (37 °C)   TempSrc: Temporal   SpO2: 98%   Weight: 68.3 kg (150 lb 9.6 oz)   Height: 182.9 cm (72.01\")     Body mass index is 20.42 kg/m².    Current Outpatient Medications on File Prior to Visit   Medication Sig Dispense Refill    venlafaxine XR (EFFEXOR-XR) 37.5 MG 24 hr capsule       methocarbamol (ROBAXIN) 750 MG tablet Take 1 tablet by mouth 3 (Three) Times a Day. (Patient not taking: Reported on 4/1/2024) 15 tablet 0     No current facility-administered medications on file prior to visit.       The following portions of the patient's history were reviewed and updated as appropriate: allergies, current medications, past family history, past medical history, past social history, " past surgical history, and problem list.    Review of Systems   HENT:  Positive for ear pain and sore throat.    Respiratory:  Positive for cough.        Objective   Physical Exam  Vitals and nursing note reviewed.   Constitutional:       Appearance: Normal appearance. He is well-developed.   HENT:      Head: Normocephalic and atraumatic.      Right Ear: Hearing, tympanic membrane, ear canal and external ear normal.      Left Ear: External ear normal. A middle ear effusion is present. Tympanic membrane is erythematous.      Nose: Nose normal. Mucosal edema present.   Eyes:      Extraocular Movements: Extraocular movements intact.      Pupils: Pupils are equal, round, and reactive to light.   Cardiovascular:      Rate and Rhythm: Normal rate and regular rhythm.      Heart sounds: Normal heart sounds.   Pulmonary:      Effort: Pulmonary effort is normal.      Breath sounds: Normal breath sounds.   Abdominal:      General: Bowel sounds are normal.      Palpations: Abdomen is soft.   Musculoskeletal:         General: Normal range of motion.      Cervical back: Normal range of motion and neck supple.        Back:    Skin:     General: Skin is warm and dry.   Neurological:      General: No focal deficit present.      Mental Status: He is alert and oriented to person, place, and time.   Psychiatric:         Mood and Affect: Mood normal.         Behavior: Behavior normal.       PHQ-9 Total Score:      Assessment & Plan   Diagnoses and all orders for this visit:    1. Sore throat (Primary)  -     POCT rapid strep A  -     POCT SARS-CoV-2 Antigen ILYA + Flu  -     loratadine (Claritin) 10 MG tablet; Take 1 tablet by mouth Daily.  Dispense: 90 tablet; Refill: 1    2. Non-recurrent acute suppurative otitis media of left ear without spontaneous rupture of tympanic membrane  -     amoxicillin (AMOXIL) 875 MG tablet; Take 1 tablet by mouth 2 (Two) Times a Day.  Dispense: 20 tablet; Refill: 0    3. Chronic bilateral low back pain  without sciatica  -     XR Spine Lumbar Complete 4+VW; Future        There are no Patient Instructions on file for this visit.

## 2024-04-04 ENCOUNTER — HOSPITAL ENCOUNTER (OUTPATIENT)
Dept: GENERAL RADIOLOGY | Facility: HOSPITAL | Age: 29
Discharge: HOME | End: 2024-04-04
Payer: COMMERCIAL

## 2024-04-04 DIAGNOSIS — M54.2 NECK PAIN: ICD-10-CM

## 2024-04-04 DIAGNOSIS — G89.29 CHRONIC BILATERAL LOW BACK PAIN WITHOUT SCIATICA: ICD-10-CM

## 2024-04-04 DIAGNOSIS — M54.9 MID BACK PAIN: ICD-10-CM

## 2024-04-04 DIAGNOSIS — M54.50 CHRONIC BILATERAL LOW BACK PAIN WITHOUT SCIATICA: ICD-10-CM

## 2024-04-04 PROCEDURE — 72050 X-RAY EXAM NECK SPINE 4/5VWS: CPT

## 2024-04-04 PROCEDURE — 72110 X-RAY EXAM L-2 SPINE 4/>VWS: CPT

## 2024-04-04 PROCEDURE — 72072 X-RAY EXAM THORAC SPINE 3VWS: CPT

## 2024-04-04 RX ORDER — METHOCARBAMOL 750 MG/1
750 TABLET, FILM COATED ORAL 3 TIMES DAILY
Qty: 15 TABLET | Refills: 0 | Status: SHIPPED | OUTPATIENT
Start: 2024-04-04

## 2024-04-04 NOTE — TELEPHONE ENCOUNTER
Rx Refill Note  Requested Prescriptions     Pending Prescriptions Disp Refills    methocarbamol (ROBAXIN) 750 MG tablet [Pharmacy Med Name: Methocarbamol Oral Tablet 750 MG] 15 tablet 0     Sig: TAKE ONE TABLET BY MOUTH THREE TIMES DAILY      Last office visit with prescribing clinician: 4/1/2024   Last telemedicine visit with prescribing clinician: Visit date not found   Next office visit with prescribing clinician: 9/11/2024                         Would you like a call back once the refill request has been completed: [] Yes [] No    If the office needs to give you a call back, can they leave a voicemail: [] Yes [] No    Ashanti Samuels MA  04/04/24, 10:31 EDT

## 2024-07-15 ENCOUNTER — TELEPHONE (OUTPATIENT)
Dept: FAMILY MEDICINE CLINIC | Facility: CLINIC | Age: 29
End: 2024-07-15
Payer: COMMERCIAL

## 2024-07-15 NOTE — TELEPHONE ENCOUNTER
Refill needed. Patient called and left message that he needs refill of medication that another provider prescribed. He did not leave medication name, dosage or frequency. I called him back and left VM to call us back with info.

## 2024-07-15 NOTE — TELEPHONE ENCOUNTER
Patient was in [facility] and they gave him 2 weeks supply of Adderall 20mg 2 caps once per day. Patient is scheduled here Wednesday, but is out of medication now. He didn't realize there were no refills as they started on 3 meds and the other 2 have refills. Asking for medication to get him to appointment. [facility] as he is at work today and can  there.

## 2024-07-17 ENCOUNTER — CLINICAL SUPPORT (OUTPATIENT)
Dept: FAMILY MEDICINE CLINIC | Facility: CLINIC | Age: 29
End: 2024-07-17
Payer: COMMERCIAL

## 2024-07-17 ENCOUNTER — TELEPHONE (OUTPATIENT)
Dept: FAMILY MEDICINE CLINIC | Facility: CLINIC | Age: 29
End: 2024-07-17

## 2024-07-17 ENCOUNTER — OFFICE VISIT (OUTPATIENT)
Dept: FAMILY MEDICINE CLINIC | Facility: CLINIC | Age: 29
End: 2024-07-17
Payer: COMMERCIAL

## 2024-07-17 VITALS
TEMPERATURE: 97.1 F | BODY MASS INDEX: 18.96 KG/M2 | OXYGEN SATURATION: 98 % | WEIGHT: 140 LBS | SYSTOLIC BLOOD PRESSURE: 116 MMHG | HEIGHT: 72 IN | HEART RATE: 68 BPM | DIASTOLIC BLOOD PRESSURE: 76 MMHG

## 2024-07-17 DIAGNOSIS — R41.840 DIFFICULTY CONCENTRATING: ICD-10-CM

## 2024-07-17 DIAGNOSIS — F32.A ANXIETY AND DEPRESSION: ICD-10-CM

## 2024-07-17 DIAGNOSIS — F41.9 ANXIETY AND DEPRESSION: ICD-10-CM

## 2024-07-17 DIAGNOSIS — E03.9 ACQUIRED HYPOTHYROIDISM: Primary | ICD-10-CM

## 2024-07-17 DIAGNOSIS — E03.9 HYPOTHYROIDISM, UNSPECIFIED TYPE: Primary | ICD-10-CM

## 2024-07-17 LAB
T4 FREE SERPL-MCNC: 1.16 NG/DL (ref 0.92–1.68)
TSH SERPL DL<=0.05 MIU/L-ACNC: 7.19 UIU/ML (ref 0.27–4.2)

## 2024-07-17 PROCEDURE — 99214 OFFICE O/P EST MOD 30 MIN: CPT | Performed by: NURSE PRACTITIONER

## 2024-07-17 PROCEDURE — 84439 ASSAY OF FREE THYROXINE: CPT | Performed by: NURSE PRACTITIONER

## 2024-07-17 PROCEDURE — 36415 COLL VENOUS BLD VENIPUNCTURE: CPT | Performed by: NURSE PRACTITIONER

## 2024-07-17 PROCEDURE — 84443 ASSAY THYROID STIM HORMONE: CPT | Performed by: NURSE PRACTITIONER

## 2024-07-17 RX ORDER — DEXTROAMPHETAMINE SACCHARATE, AMPHETAMINE ASPARTATE MONOHYDRATE, DEXTROAMPHETAMINE SULFATE AND AMPHETAMINE SULFATE 5; 5; 5; 5 MG/1; MG/1; MG/1; MG/1
20 CAPSULE, EXTENDED RELEASE ORAL EVERY MORNING
Qty: 30 CAPSULE | Refills: 0 | Status: SHIPPED | OUTPATIENT
Start: 2024-07-17

## 2024-07-17 RX ORDER — HYDROXYZINE PAMOATE 25 MG/1
25 CAPSULE ORAL 3 TIMES DAILY PRN
Qty: 90 CAPSULE | Refills: 3 | Status: SHIPPED | OUTPATIENT
Start: 2024-07-17

## 2024-07-17 RX ORDER — DEXTROAMPHETAMINE SACCHARATE, AMPHETAMINE ASPARTATE MONOHYDRATE, DEXTROAMPHETAMINE SULFATE AND AMPHETAMINE SULFATE 5; 5; 5; 5 MG/1; MG/1; MG/1; MG/1
20 CAPSULE, EXTENDED RELEASE ORAL EVERY MORNING
Qty: 30 CAPSULE | Refills: 0 | Status: SHIPPED | OUTPATIENT
Start: 2024-07-17 | End: 2024-07-17 | Stop reason: SDUPTHER

## 2024-07-17 RX ORDER — HYDROXYZINE PAMOATE 25 MG/1
25 CAPSULE ORAL 3 TIMES DAILY PRN
COMMUNITY
Start: 2024-07-01 | End: 2024-07-17 | Stop reason: SDUPTHER

## 2024-07-17 RX ORDER — LEVOTHYROXINE SODIUM 0.03 MG/1
25 TABLET ORAL DAILY
COMMUNITY
Start: 2024-07-01 | End: 2024-07-18

## 2024-07-17 RX ORDER — DEXTROAMPHETAMINE SACCHARATE, AMPHETAMINE ASPARTATE MONOHYDRATE, DEXTROAMPHETAMINE SULFATE AND AMPHETAMINE SULFATE 5; 5; 5; 5 MG/1; MG/1; MG/1; MG/1
20 CAPSULE, EXTENDED RELEASE ORAL EVERY MORNING
COMMUNITY
Start: 2024-07-01 | End: 2024-07-17 | Stop reason: SDUPTHER

## 2024-07-17 NOTE — PROGRESS NOTES
Subjective   Tobi Horne is a 29 y.o. male presents for   Chief Complaint   Patient presents with    inpatient hospilization     Patient did not want to say what this was for except that it is a follow up        Health Maintenance Due   Topic Date Due    HEPATITIS C SCREENING  Never done    COVID-19 Vaccine (1 - 2023-24 season) Never done       History of Present Illness   Patient present to follow-up hospitalization at [facility].  He was recently hospitalized for suicidal ideation.  He states prior to that he had been going to [facility] in [place], and felt the medications weren't working well and made him spiral out of control.  He developed suicidal thinking and was treated for a week in [facility]. He was also started on thyroid medication and hydroxyzine.  He states he was also prescrided adderall and states it helped him feel better.  He states while taking Adderall he is able to think better, sleep better and cope with life in general while on adderall.  He has not been taking it the past few days as he was only given a 2-week prescription upon discharge.  He states he is starting to feel the same way that he did prior to hospitalization, and states that he feels like no one is able to help him and that he is spiraling out of control again.  He is not currently taking medication for depression and he is utilizing Talk therapy for depression.  He also has an appointment with [facility] tomorrow.  It is and online platform.  He also has a follow-up scheduled with [facility] on Monday of next week.  He states that he has had previous providers that he did not feel were helpful in the past and is afraid of getting in the same situation.  He would also like referral to a provider within [facility] in the event that he does not feel comfortable with either of his other appointments.  Discussed with patient that he will need to be evaluated and formally diagnosed with ADD or ADHD to continue  "with Adderall from this PCP, or psychiatry will need to manage moving forward as he reports it was very helpful with his overall mood.  Limited records received from [facility] and stated major depression with SI at the time.  Patient currently denies SI.  Also discussed with patient that I will send in 30-day prescription of Adderall while waiting for formal evaluation and ongoing follow-up with psychiatry.  He verbalized understanding of plan.  We will also recheck thyroid today as 25 mcg is likely insufficient to manage, and I will adjust medication dosage when results received.  Vitals:    07/17/24 1239   BP: 116/76   BP Location: Left arm   Patient Position: Sitting   Cuff Size: Adult   Pulse: 68   Temp: 97.1 °F (36.2 °C)   TempSrc: Tympanic   SpO2: 98%   Weight: 63.5 kg (140 lb)   Height: 182.9 cm (72.01\")     Body mass index is 18.98 kg/m².    Current Outpatient Medications on File Prior to Visit   Medication Sig Dispense Refill    levothyroxine (SYNTHROID, LEVOTHROID) 25 MCG tablet Take 1 tablet by mouth Daily.      loratadine (Claritin) 10 MG tablet Take 1 tablet by mouth Daily. 90 tablet 1    [DISCONTINUED] amphetamine-dextroamphetamine XR (ADDERALL XR) 20 MG 24 hr capsule Take 1 capsule by mouth Every Morning      [DISCONTINUED] hydrOXYzine pamoate (VISTARIL) 25 MG capsule Take 1 capsule by mouth 3 (Three) Times a Day As Needed.      [DISCONTINUED] amoxicillin (AMOXIL) 875 MG tablet Take 1 tablet by mouth 2 (Two) Times a Day. (Patient not taking: Reported on 7/17/2024) 20 tablet 0    [DISCONTINUED] methocarbamol (ROBAXIN) 750 MG tablet TAKE ONE TABLET BY MOUTH THREE TIMES DAILY (Patient not taking: Reported on 7/17/2024) 15 tablet 0    [DISCONTINUED] venlafaxine XR (EFFEXOR-XR) 37.5 MG 24 hr capsule  (Patient not taking: Reported on 7/17/2024)       No current facility-administered medications on file prior to visit.       The following portions of the patient's history were reviewed and updated as " appropriate: allergies, current medications, past family history, past medical history, past social history, past surgical history, and problem list.    Review of Systems   Constitutional:  Positive for fatigue.   Psychiatric/Behavioral:  Positive for decreased concentration, sleep disturbance and stress. The patient is nervous/anxious.        Objective   Physical Exam  Vitals and nursing note reviewed.   Constitutional:       Appearance: Normal appearance. He is well-developed.   HENT:      Head: Normocephalic and atraumatic.      Right Ear: External ear normal.      Left Ear: External ear normal.      Nose: Nose normal.   Eyes:      Extraocular Movements: Extraocular movements intact.      Pupils: Pupils are equal, round, and reactive to light.   Cardiovascular:      Rate and Rhythm: Normal rate and regular rhythm.      Heart sounds: Normal heart sounds.   Pulmonary:      Effort: Pulmonary effort is normal.      Breath sounds: Normal breath sounds.   Abdominal:      General: Bowel sounds are normal.      Palpations: Abdomen is soft.   Musculoskeletal:         General: Normal range of motion.      Cervical back: Normal range of motion and neck supple.   Skin:     General: Skin is warm and dry.   Neurological:      General: No focal deficit present.      Mental Status: He is alert and oriented to person, place, and time.   Psychiatric:         Mood and Affect: Mood is anxious.         Behavior: Behavior is cooperative.       PHQ-9 Total Score:      Assessment & Plan   Diagnoses and all orders for this visit:    1. Hypothyroidism, unspecified type (Primary)  -     TSH  -     T4, Free    2. Difficulty concentrating  -     amphetamine-dextroamphetamine XR (ADDERALL XR) 20 MG 24 hr capsule; Take 1 capsule by mouth Every Morning  Dispense: 30 capsule; Refill: 0  -     Ambulatory Referral to Psychiatry    3. Anxiety and depression  -     Ambulatory Referral to Psychiatry  -     hydrOXYzine pamoate (VISTARIL) 25 MG  capsule; Take 1 capsule by mouth 3 (Three) Times a Day As Needed for Anxiety.  Dispense: 90 capsule; Refill: 3        There are no Patient Instructions on file for this visit.

## 2024-07-17 NOTE — TELEPHONE ENCOUNTER
Caller: Tobi Horne    Relationship: Self    Best call back number: [phone]    Requested Prescriptions:   Requested Prescriptions     Pending Prescriptions Disp Refills    amphetamine-dextroamphetamine XR (ADDERALL XR) 20 MG 24 hr capsule 30 capsule 0     Sig: Take 1 capsule by mouth Every Morning        Pharmacy where request should be sent: [facility] [address] [place] [address] [address] - [phone]  - [phone] FX     Last office visit with prescribing clinician: 7/17/2024   Last telemedicine visit with prescribing clinician: Visit date not found   Next office visit with prescribing clinician: 9/11/2024     Additional details provided by patient: [employer] TOLD HIM THAT THEY COULD NOT FILL IT IN [place].  HE NEEDED TO FIND A LOCATION IN [place].  HE IS NOT EVEN SURE HIS INSURANCE WILL ACCEPT THIS.     Does the patient have less than a 3 day supply:  [x] Yes  [] No    Would you like a call back once the refill request has been completed: [x] Yes [] No    If the office needs to give you a call back, can they leave a voicemail: [x] Yes [] No    Silvestre Arceo Rep   07/17/24 15:10 EDT

## 2024-07-17 NOTE — PROGRESS NOTES
Venipuncture performed on Left Arm by Ashanti Samuels MA  with good hemostasis. Patient tolerated well. 07/17/24  CRISTOBAL Meier

## 2024-07-17 NOTE — TELEPHONE ENCOUNTER
Caller: EDUIN WARD    Relationship: Emergency Contact    Best call back number:     What was the call regarding: PATIENT IS NEEDING THE ADDERALL TO GO TO [facility] IN [place]     HE HAS BEEN OUT OF MEDICATION FOR 3 DAYS         Is it okay if the provider responds through MyChart: CALL IF NEEDED

## 2024-07-17 NOTE — TELEPHONE ENCOUNTER
Pharmacy Name: [id number] - [place], [place] - [address]. - [phone]  - [phone] FX     Reference Number (if applicable): [id number]/A    Pharmacy representative name: ADRIÁN    Pharmacy representative phone number: [phone]     What medication are you calling in regards to: amphetamine-dextroamphetamine XR (ADDERALL XR) 20 MG 24 hr capsule     What question does the pharmacy have: NEEDS TO BE PRESCRIBED BY AN MD    Who is the provider that prescribed the medication: KIMBERLY SKELTON    Additional notes: IF APRN WRITES PRESCRIPTION THEY MUST BE PSYCH CERTIFIED.

## 2024-07-17 NOTE — TELEPHONE ENCOUNTER
PATIENT'S WIFE EDUIN CALLED FOR MEDICATION REFILL O F      amphetamine-dextroamphetamine XR (ADDERALL XR) 20 MG 24 hr capsule     TO GO TO [address] [place], [place] - [address] [facility] [address]  - [phone]  - [phone]  [phone]     CALL BACK NUMBER  [phone]

## 2024-07-17 NOTE — TELEPHONE ENCOUNTER
Rx Refill Note  Requested Prescriptions     Pending Prescriptions Disp Refills    amphetamine-dextroamphetamine XR (ADDERALL XR) 20 MG 24 hr capsule 30 capsule 0     Sig: Take 1 capsule by mouth Every Morning      Last office visit with prescribing clinician: 7/17/2024   Last telemedicine visit with prescribing clinician: Visit date not found   Next office visit with prescribing clinician: 9/11/2024                         Would you like a call back once the refill request has been completed: [] Yes [] No    If the office needs to give you a call back, can they leave a voicemail: [] Yes [] No    Ashanti Samuels MA  07/17/24, 15:34 EDT

## 2024-07-18 RX ORDER — LEVOTHYROXINE SODIUM 0.07 MG/1
75 TABLET ORAL DAILY
Qty: 30 TABLET | Refills: 3 | Status: SHIPPED | OUTPATIENT
Start: 2024-07-18

## 2024-09-11 ENCOUNTER — OFFICE VISIT (OUTPATIENT)
Dept: FAMILY MEDICINE CLINIC | Facility: CLINIC | Age: 29
End: 2024-09-11
Payer: COMMERCIAL

## 2024-09-11 ENCOUNTER — CLINICAL SUPPORT (OUTPATIENT)
Dept: FAMILY MEDICINE CLINIC | Facility: CLINIC | Age: 29
End: 2024-09-11
Payer: COMMERCIAL

## 2024-09-11 VITALS
OXYGEN SATURATION: 98 % | HEART RATE: 112 BPM | BODY MASS INDEX: 17.88 KG/M2 | WEIGHT: 132 LBS | HEIGHT: 72 IN | DIASTOLIC BLOOD PRESSURE: 82 MMHG | TEMPERATURE: 100.5 F | SYSTOLIC BLOOD PRESSURE: 112 MMHG

## 2024-09-11 DIAGNOSIS — M25.40 JOINT SWELLING: ICD-10-CM

## 2024-09-11 DIAGNOSIS — K64.9 HEMORRHOIDS, UNSPECIFIED HEMORRHOID TYPE: ICD-10-CM

## 2024-09-11 DIAGNOSIS — R30.9 PAINFUL URINATION: ICD-10-CM

## 2024-09-11 DIAGNOSIS — Z00.00 ROUTINE GENERAL MEDICAL EXAMINATION AT A HEALTH CARE FACILITY: Primary | ICD-10-CM

## 2024-09-11 DIAGNOSIS — E03.9 ACQUIRED HYPOTHYROIDISM: ICD-10-CM

## 2024-09-11 DIAGNOSIS — M19.90 ARTHRITIS: ICD-10-CM

## 2024-09-11 DIAGNOSIS — Z00.00 ANNUAL PHYSICAL EXAM: Primary | ICD-10-CM

## 2024-09-11 LAB
ALBUMIN SERPL-MCNC: 4.6 G/DL (ref 3.5–5.2)
ALBUMIN/GLOB SERPL: 1.6 G/DL
ALP SERPL-CCNC: 72 U/L (ref 39–117)
ALT SERPL W P-5'-P-CCNC: 16 U/L (ref 1–41)
ANION GAP SERPL CALCULATED.3IONS-SCNC: 9 MMOL/L (ref 5–15)
AST SERPL-CCNC: 14 U/L (ref 1–40)
BASOPHILS # BLD AUTO: 0.02 10*3/MM3 (ref 0–0.2)
BASOPHILS NFR BLD AUTO: 0.4 % (ref 0–1.5)
BILIRUB BLD-MCNC: NEGATIVE MG/DL
BILIRUB SERPL-MCNC: 0.2 MG/DL (ref 0–1.2)
BUN SERPL-MCNC: 11 MG/DL (ref 6–20)
BUN/CREAT SERPL: 14.9 (ref 7–25)
CALCIUM SPEC-SCNC: 9.9 MG/DL (ref 8.6–10.5)
CHLORIDE SERPL-SCNC: 102 MMOL/L (ref 98–107)
CHOLEST SERPL-MCNC: 136 MG/DL (ref 0–200)
CHROMATIN AB SERPL-ACNC: <10 IU/ML (ref 0–14)
CLARITY, POC: CLEAR
CO2 SERPL-SCNC: 26 MMOL/L (ref 22–29)
COLOR UR: YELLOW
CREAT SERPL-MCNC: 0.74 MG/DL (ref 0.76–1.27)
CRP SERPL-MCNC: <0.3 MG/DL (ref 0–0.5)
DEPRECATED RDW RBC AUTO: 38.9 FL (ref 37–54)
EGFRCR SERPLBLD CKD-EPI 2021: 125.8 ML/MIN/1.73
EOSINOPHIL # BLD AUTO: 0.02 10*3/MM3 (ref 0–0.4)
EOSINOPHIL NFR BLD AUTO: 0.4 % (ref 0.3–6.2)
ERYTHROCYTE [DISTWIDTH] IN BLOOD BY AUTOMATED COUNT: 11.9 % (ref 12.3–15.4)
ERYTHROCYTE [SEDIMENTATION RATE] IN BLOOD: 3 MM/HR (ref 0–15)
EXPIRATION DATE: NORMAL
GLOBULIN UR ELPH-MCNC: 2.8 GM/DL
GLUCOSE SERPL-MCNC: 79 MG/DL (ref 65–99)
GLUCOSE UR STRIP-MCNC: NEGATIVE MG/DL
HCT VFR BLD AUTO: 42.7 % (ref 37.5–51)
HDLC SERPL-MCNC: 71 MG/DL (ref 40–60)
HGB BLD-MCNC: 14.7 G/DL (ref 13–17.7)
IMM GRANULOCYTES # BLD AUTO: 0.01 10*3/MM3 (ref 0–0.05)
IMM GRANULOCYTES NFR BLD AUTO: 0.2 % (ref 0–0.5)
KETONES UR QL: NEGATIVE
LDLC SERPL CALC-MCNC: 57 MG/DL (ref 0–100)
LDLC/HDLC SERPL: 0.84 {RATIO}
LEUKOCYTE EST, POC: NEGATIVE
LYMPHOCYTES # BLD AUTO: 1.41 10*3/MM3 (ref 0.7–3.1)
LYMPHOCYTES NFR BLD AUTO: 28.8 % (ref 19.6–45.3)
Lab: NORMAL
MCH RBC QN AUTO: 31.3 PG (ref 26.6–33)
MCHC RBC AUTO-ENTMCNC: 34.4 G/DL (ref 31.5–35.7)
MCV RBC AUTO: 90.9 FL (ref 79–97)
MONOCYTES # BLD AUTO: 0.74 10*3/MM3 (ref 0.1–0.9)
MONOCYTES NFR BLD AUTO: 15.1 % (ref 5–12)
NEUTROPHILS NFR BLD AUTO: 2.69 10*3/MM3 (ref 1.7–7)
NEUTROPHILS NFR BLD AUTO: 55.1 % (ref 42.7–76)
NITRITE UR-MCNC: NEGATIVE MG/ML
NRBC BLD AUTO-RTO: 0 /100 WBC (ref 0–0.2)
PH UR: 6.5 [PH] (ref 5–8)
PLATELET # BLD AUTO: 379 10*3/MM3 (ref 140–450)
PMV BLD AUTO: 10.8 FL (ref 6–12)
POTASSIUM SERPL-SCNC: 4.1 MMOL/L (ref 3.5–5.2)
PROT SERPL-MCNC: 7.4 G/DL (ref 6–8.5)
PROT UR STRIP-MCNC: NEGATIVE MG/DL
PSA SERPL-MCNC: 0.68 NG/ML (ref 0–4)
RBC # BLD AUTO: 4.7 10*6/MM3 (ref 4.14–5.8)
RBC # UR STRIP: NEGATIVE /UL
SODIUM SERPL-SCNC: 137 MMOL/L (ref 136–145)
SP GR UR: 1.03 (ref 1–1.03)
TRIGL SERPL-MCNC: 28 MG/DL (ref 0–150)
TSH SERPL DL<=0.05 MIU/L-ACNC: 4.22 UIU/ML (ref 0.27–4.2)
UROBILINOGEN UR QL: NORMAL
VLDLC SERPL-MCNC: 8 MG/DL (ref 5–40)
WBC NRBC COR # BLD AUTO: 4.89 10*3/MM3 (ref 3.4–10.8)

## 2024-09-11 PROCEDURE — 85025 COMPLETE CBC W/AUTO DIFF WBC: CPT | Performed by: NURSE PRACTITIONER

## 2024-09-11 PROCEDURE — 80053 COMPREHEN METABOLIC PANEL: CPT | Performed by: NURSE PRACTITIONER

## 2024-09-11 PROCEDURE — 36415 COLL VENOUS BLD VENIPUNCTURE: CPT | Performed by: NURSE PRACTITIONER

## 2024-09-11 PROCEDURE — 86140 C-REACTIVE PROTEIN: CPT | Performed by: NURSE PRACTITIONER

## 2024-09-11 PROCEDURE — 84153 ASSAY OF PSA TOTAL: CPT | Performed by: NURSE PRACTITIONER

## 2024-09-11 PROCEDURE — 99214 OFFICE O/P EST MOD 30 MIN: CPT | Performed by: NURSE PRACTITIONER

## 2024-09-11 PROCEDURE — 85652 RBC SED RATE AUTOMATED: CPT | Performed by: NURSE PRACTITIONER

## 2024-09-11 PROCEDURE — 84443 ASSAY THYROID STIM HORMONE: CPT | Performed by: NURSE PRACTITIONER

## 2024-09-11 PROCEDURE — 81003 URINALYSIS AUTO W/O SCOPE: CPT | Performed by: NURSE PRACTITIONER

## 2024-09-11 PROCEDURE — 86431 RHEUMATOID FACTOR QUANT: CPT | Performed by: NURSE PRACTITIONER

## 2024-09-11 PROCEDURE — 86038 ANTINUCLEAR ANTIBODIES: CPT | Performed by: NURSE PRACTITIONER

## 2024-09-11 PROCEDURE — 80061 LIPID PANEL: CPT | Performed by: NURSE PRACTITIONER

## 2024-09-11 PROCEDURE — 99395 PREV VISIT EST AGE 18-39: CPT | Performed by: NURSE PRACTITIONER

## 2024-09-11 RX ORDER — COCOA BUTTER, PHENYLEPHRINE HYDROCHLORIDE 2211; 6.25 MG/1; MG/1
1 SUPPOSITORY RECTAL AS NEEDED
Qty: 12 SUPPOSITORY | Refills: 1 | Status: SHIPPED | OUTPATIENT
Start: 2024-09-11

## 2024-09-11 NOTE — PROGRESS NOTES
Venipuncture performed on Left Arm by Ashanti Samuels MA  with good hemostasis. Patient tolerated well. 09/11/24  CRISTOBAL Meier

## 2024-09-11 NOTE — PROGRESS NOTES
Subjective   Tobi Horne is a 29 y.o. male presents for   Chief Complaint   Patient presents with    Annual Exam    Difficulty Urinating    Possible hernia     He states his bellybutton is poking out        Health Maintenance Due   Topic Date Due    HEPATITIS C SCREENING  Never done    ANNUAL PHYSICAL  08/14/2024    COVID-19 Vaccine (1 - 2023-24 season) Never done    INFLUENZA VACCINE  08/01/2024       History of Present Illness  The patient is a 29-year-old male who presents for an annual physical.    He reports significant improvement in his mental health compared to a few months ago, with no current issues. He is under the care of Dr. Rodrigues, a psychiatrist, whom he recently visited. His thyroid was checked prior to a dosage increase, which he believes has been beneficial. He has noticed a decrease in cold sensitivity and an overall improvement in his mood. He also mentions a slight weight loss, which he attributes to a decreased appetite. However, he now experiences constant hunger and eats throughout the day. This issue is being monitored by his psychiatrist.    He is experiencing painful urination and suspects a hemorrhoid due to the presence of bright red blood in his stool. He has not observed any blood in his urine. Despite a history of urinary tract infections (UTIs), he does not believe this is a UTI as the symptoms have persisted for a month, longer than his usual UTI duration of 2 weeks. He also reports pressure in the perineum area, which intensifies when sitting. He reports no back pain beyond his chronic condition. He has been waking up frequently at night to use the restroom over the past few weeks, always with pain. These symptoms began a month ago. He also reports pain during bowel movements, alternating between constipation and frequent bowel movements. He has attempted to manage these symptoms by staying hydrated and making dietary changes, but is unsure of their effectiveness. He has not  "used Tucks pads or similar products. He is considering noninvasive testing to rule out prostate issues. He also reports a tugging sensation around his belly button, which feels as though it is protruding in different directions. This pain is most noticeable when lying down.    He is experiencing pain where his hamstring meets his hip, particularly on the left side. He also reports unusual symptoms in his knees, which become very red and swollen after lying down for 15 minutes or more. He has had a minor physical examination but no imaging. He describes the sensation as intense pressure rather than pain, which worsens towards the end of the day. He has previously tested negative for ankylosing spondylitis.        Vitals:    09/11/24 1240   BP: 112/82   BP Location: Right arm   Patient Position: Sitting   Cuff Size: Adult   Pulse: 112   Temp: 100.5 °F (38.1 °C)   TempSrc: Tympanic   SpO2: 98%   Weight: 59.9 kg (132 lb)   Height: 182.9 cm (72.01\")     Body mass index is 17.9 kg/m².    Current Outpatient Medications on File Prior to Visit   Medication Sig Dispense Refill    amphetamine-dextroamphetamine XR (ADDERALL XR) 20 MG 24 hr capsule Take 1 capsule by mouth Every Morning 30 capsule 0    hydrOXYzine pamoate (VISTARIL) 25 MG capsule Take 1 capsule by mouth 3 (Three) Times a Day As Needed for Anxiety. 90 capsule 3    levothyroxine (Synthroid) 75 MCG tablet Take 1 tablet by mouth Daily. 30 tablet 3    [DISCONTINUED] loratadine (Claritin) 10 MG tablet Take 1 tablet by mouth Daily. (Patient not taking: Reported on 9/11/2024) 90 tablet 1     No current facility-administered medications on file prior to visit.       The following portions of the patient's history were reviewed and updated as appropriate: allergies, current medications, past family history, past medical history, past social history, past surgical history, and problem list.    Review of Systems   Gastrointestinal:  Positive for abdominal pain, blood in " stool and rectal pain.   Genitourinary:  Positive for dysuria.       Objective   Physical Exam  Vitals and nursing note reviewed.   Constitutional:       Appearance: Normal appearance. He is well-developed.   HENT:      Head: Normocephalic and atraumatic.      Right Ear: External ear normal.      Left Ear: External ear normal.      Nose: Nose normal.   Eyes:      Extraocular Movements: Extraocular movements intact.      Pupils: Pupils are equal, round, and reactive to light.   Cardiovascular:      Rate and Rhythm: Normal rate and regular rhythm.      Heart sounds: Normal heart sounds.   Pulmonary:      Effort: Pulmonary effort is normal.      Breath sounds: Normal breath sounds.   Abdominal:      General: Bowel sounds are normal.      Palpations: Abdomen is soft.      Tenderness: There is abdominal tenderness (just below umbilicus, no abnormality palpated).   Genitourinary:     Comments: Pt declined exam  Musculoskeletal:         General: Normal range of motion.      Cervical back: Normal range of motion and neck supple.      Right knee: Normal.      Left knee: Normal.   Skin:     General: Skin is warm and dry.   Neurological:      General: No focal deficit present.      Mental Status: He is alert and oriented to person, place, and time.   Psychiatric:         Mood and Affect: Mood normal.         Behavior: Behavior normal.            No hernia detected in the abdomen.    PHQ-9 Total Score:      Results  Laboratory Studies  Urine test shows no abnormalities.    Assessment & Plan   Diagnoses and all orders for this visit:    1. Routine general medical examination at a health care facility (Primary)  Comments:  Patient counseled on importance of balanced diet and routine exercise as well as risk and benefits of current recommended vaccines  Orders:  -     CBC Auto Differential  -     Comprehensive Metabolic Panel  -     Lipid Panel    2. Painful urination  -     POC Urinalysis Dipstick, Automated  -     Ambulatory  Referral to Urology  -     PSA DIAGNOSTIC ONLY    3. Arthritis  -     RILEY  -     Rheumatoid Factor  -     C-reactive Protein  -     Sedimentation Rate    4. Joint swelling  -     RILEY  -     Rheumatoid Factor  -     C-reactive Protein  -     Sedimentation Rate    5. Acquired hypothyroidism  -     TSH    6. Hemorrhoids, unspecified hemorrhoid type  -     phenylephrine-cocoa Butter (Preparation H) 0.25-88.44 % suppository suppository; Insert 1 suppository into the rectum As Needed for Hemorrhoids.  Dispense: 12 suppository; Refill: 1         1. Annual Physical.  His urine analysis results are within normal limits. He has experienced weight loss, which is being monitored by his psychiatrist. A referral to urology will be made for further evaluation. A PSA test will be conducted today. Rectal suppositories will be provided for internal hemorrhoids, and Preparation H pads or Tucks pads are recommended for external hemorrhoids. Information on hemorrhoids will be given. Lab work will be performed today to assess the need for any adjustments to his thyroid medication. His prostate level and inflammatory labs will also be checked. He declined the influenza vaccine today. Information on weight gain will be provided. Should there be no improvement, an evaluation for a possible fissure will be considered.    2. Joint Swelling.  Inflammatory labs were last conducted in 2016. He previously tested positive for RILEY and Sjogren's antibody, which could explain the color changes in his knees and contribute to his arthritis symptoms. An underlying inflammatory condition could account for the majority of his symptoms. Inflammatory labs will be repeated today to investigate potential causes of joint swelling, which could also be contributing to his other pain symptoms.    Follow-up  A follow-up visit is scheduled in 6 months.    There are no Patient Instructions on file for this visit.         Patient or patient representative verbalized  consent for the use of Ambient Listening during the visit with  CRISTOBAL Meier for chart documentation. 9/11/2024  13:07 EDT

## 2024-09-13 LAB — ANA SER QL: NEGATIVE

## 2024-11-14 RX ORDER — LEVOTHYROXINE SODIUM 75 UG/1
TABLET ORAL
Qty: 30 TABLET | Refills: 6 | Status: SHIPPED | OUTPATIENT
Start: 2024-11-14

## 2024-11-14 NOTE — TELEPHONE ENCOUNTER
Rx Refill Note  Requested Prescriptions     Pending Prescriptions Disp Refills    levothyroxine (SYNTHROID, LEVOTHROID) 75 MCG tablet [Pharmacy Med Name: Levothyroxine Sodium Oral Tablet 75 MCG] 30 tablet 0     Sig: TAKE 1 TABLET BY MOUTH ONE TIME DAILY      Last office visit with prescribing clinician: 9/11/2024   Last telemedicine visit with prescribing clinician: Visit date not found   Next office visit with prescribing clinician: 3/11/2025                         Would you like a call back once the refill request has been completed: [] Yes [] No    If the office needs to give you a call back, can they leave a voicemail: [] Yes [] No    Ashanti Samuels MA  11/14/24, 12:44 EST

## 2025-06-16 RX ORDER — LEVOTHYROXINE SODIUM 75 UG/1
75 TABLET ORAL DAILY
Qty: 30 TABLET | Refills: 0 | Status: SHIPPED | OUTPATIENT
Start: 2025-06-16

## 2025-06-16 NOTE — TELEPHONE ENCOUNTER
Rx Refill Note  Requested Prescriptions     Pending Prescriptions Disp Refills   • levothyroxine (SYNTHROID, LEVOTHROID) 75 MCG tablet [Pharmacy Med Name: Levothyroxine Sodium Oral Tablet 75 MCG] 30 tablet 0     Sig: TAKE 1 TABLET BY MOUTH ONCE DAILY      Last office visit with prescribing clinician: 9/11/2024   Last telemedicine visit with prescribing clinician: Visit date not found   Next office visit with prescribing clinician: Visit date not found       Would you like a call back once the refill request has been completed: [] Yes [] No    If the office needs to give you a call back, can they leave a voicemail: [] Yes [] No    Juanita Mejía MA  06/16/25, 09:57 EDT

## 2025-07-14 RX ORDER — LEVOTHYROXINE SODIUM 75 UG/1
75 TABLET ORAL DAILY
Qty: 30 TABLET | Refills: 0 | Status: SHIPPED | OUTPATIENT
Start: 2025-07-14

## 2025-07-14 NOTE — TELEPHONE ENCOUNTER
Rx Refill Note  Requested Prescriptions     Pending Prescriptions Disp Refills    levothyroxine (SYNTHROID, LEVOTHROID) 75 MCG tablet [Pharmacy Med Name: Levothyroxine Sodium Oral Tablet 75 MCG] 30 tablet 0     Sig: TAKE 1 TABLET BY MOUTH DAILY      Last office visit with prescribing clinician: 9/11/2024   Last telemedicine visit with prescribing clinician: Visit date not found   Next office visit with prescribing clinician: Visit date not found                         Would you like a call back once the refill request has been completed: [] Yes [] No    If the office needs to give you a call back, can they leave a voicemail: [] Yes [] No    Daly Dupree MA  07/14/25, 07:49 EDT

## 2025-07-15 ENCOUNTER — TELEPHONE (OUTPATIENT)
Dept: FAMILY MEDICINE CLINIC | Facility: CLINIC | Age: 30
End: 2025-07-15
Payer: COMMERCIAL

## 2025-08-15 RX ORDER — LEVOTHYROXINE SODIUM 75 UG/1
75 TABLET ORAL DAILY
Qty: 30 TABLET | Refills: 0 | OUTPATIENT
Start: 2025-08-15